# Patient Record
Sex: MALE | Race: WHITE | NOT HISPANIC OR LATINO | Employment: UNEMPLOYED | URBAN - METROPOLITAN AREA
[De-identification: names, ages, dates, MRNs, and addresses within clinical notes are randomized per-mention and may not be internally consistent; named-entity substitution may affect disease eponyms.]

---

## 2018-03-16 ENCOUNTER — APPOINTMENT (OUTPATIENT)
Dept: RADIOLOGY | Facility: CLINIC | Age: 23
End: 2018-03-16
Payer: COMMERCIAL

## 2018-03-16 ENCOUNTER — OFFICE VISIT (OUTPATIENT)
Dept: URGENT CARE | Facility: CLINIC | Age: 23
End: 2018-03-16
Payer: COMMERCIAL

## 2018-03-16 DIAGNOSIS — S53.409A ELBOW SPRAIN, INITIAL ENCOUNTER: Primary | ICD-10-CM

## 2018-03-16 DIAGNOSIS — S59.909A ELBOW INJURY, INITIAL ENCOUNTER: ICD-10-CM

## 2018-03-16 PROCEDURE — 73080 X-RAY EXAM OF ELBOW: CPT

## 2018-03-16 PROCEDURE — G0382 LEV 3 HOSP TYPE B ED VISIT: HCPCS | Performed by: PHYSICIAN ASSISTANT

## 2018-03-16 PROCEDURE — 99283 EMERGENCY DEPT VISIT LOW MDM: CPT | Performed by: PHYSICIAN ASSISTANT

## 2018-03-16 NOTE — PROGRESS NOTES
[facility]        NAME: Shalonda Richards is a 25 y o  male  : 1995    MRN: [id number]  DATE: 2018  TIME: 11:11 AM    Assessment and Plan   Elbow sprain, initial encounter [Q39 [id number]]  1  Elbow sprain, initial encounter  XR elbow 3+ vw left       Patient Instructions     Rest, apply ice, apply compression via ace, and discontinue splint  Follow up with PCP in 3-5 days  Proceed to  ER if symptoms worsen  Chief Complaint   No chief complaint on file  History of Present Illness       20yo M p/w pain in left elbow s/p fall onto outstretched hand 2 days ago  Patient states he hyperextended elbow during fall  Patient endorses diffuse, sharp 8/10 pain  Pt is unable to fully flex elbow 2/2 pain  Patient denies numbness, weakness, tingling, color change  Review of Systems   Review of Systems   Constitutional: Negative for activity change, appetite change, chills, diaphoresis, fatigue, fever and unexpected weight change  Respiratory: Negative for apnea, cough, choking, chest tightness, shortness of breath, wheezing and stridor  Cardiovascular: Negative for chest pain, palpitations and leg swelling  Musculoskeletal: Positive for arthralgias and myalgias  Negative for back pain, gait problem, joint swelling, neck pain and neck stiffness  Current Medications     No current outpatient prescriptions on file  Current Allergies     Allergies as of 2018    (Not on File)            The following portions of the patient's history were reviewed and updated as appropriate: allergies, current medications, past family history, past medical history, past social history, past surgical history and problem list      No past medical history on file  No past surgical history on file  No family history on file  Medications have been verified  Objective   There were no vitals taken for this visit         Physical Exam     Physical Exam   Constitutional: He appears well-developed and well-nourished  Cardiovascular: Normal rate, regular rhythm, normal heart sounds and intact distal pulses  Exam reveals no gallop and no friction rub  No murmur heard  Pulmonary/Chest: Effort normal and breath sounds normal  No respiratory distress  He has no wheezes  He has no rales  He exhibits no tenderness  Musculoskeletal:        Left elbow: He exhibits decreased range of motion  He exhibits no swelling, no effusion, no deformity and no laceration  Tenderness found  Radial head, medial epicondyle, lateral epicondyle and olecranon process tenderness noted

## 2018-04-09 ENCOUNTER — OFFICE VISIT (OUTPATIENT)
Dept: URGENT CARE | Facility: CLINIC | Age: 23
End: 2018-04-09
Payer: COMMERCIAL

## 2018-04-09 VITALS
RESPIRATION RATE: 16 BRPM | OXYGEN SATURATION: 98 % | SYSTOLIC BLOOD PRESSURE: 136 MMHG | BODY MASS INDEX: 25.73 KG/M2 | TEMPERATURE: 99.8 F | WEIGHT: 190 LBS | HEART RATE: 68 BPM | HEIGHT: 72 IN | DIASTOLIC BLOOD PRESSURE: 76 MMHG

## 2018-04-09 DIAGNOSIS — H60.501 ACUTE OTITIS EXTERNA OF RIGHT EAR, UNSPECIFIED TYPE: Primary | ICD-10-CM

## 2018-04-09 PROCEDURE — G0382 LEV 3 HOSP TYPE B ED VISIT: HCPCS | Performed by: PHYSICIAN ASSISTANT

## 2018-04-09 PROCEDURE — 99283 EMERGENCY DEPT VISIT LOW MDM: CPT | Performed by: PHYSICIAN ASSISTANT

## 2018-04-09 NOTE — PROGRESS NOTES
[email or web address]        NAME: Nahum Keating is a 25 y o  male  : 1995    MRN: [id number]  DATE: 2018  TIME: 3:02 PM    Assessment and Plan   Acute otitis externa of right ear, unspecified type [H60 501]  1  Acute otitis externa of right ear, unspecified type  neomycin-polymyxin-hydrocortisone (CORTISPORIN) otic solution     Patient Instructions     Take antibiotic drops as prescribed  Follow up with PCP in 3-5 days  Proceed to  ER if symptoms worsen  Chief Complaint     Chief Complaint   Patient presents with    Earache     bilateral ear pain left greater than right     History of Present Illness       Earache    There is pain in both ears  This is a new problem  The current episode started yesterday  The problem occurs constantly  The problem has been gradually worsening  There has been no fever  The pain is moderate  Associated symptoms include ear discharge  Pertinent negatives include no abdominal pain, coughing, diarrhea, headaches, hearing loss, neck pain, rash, rhinorrhea, sore throat or vomiting  He has tried nothing for the symptoms  There is no history of a chronic ear infection, hearing loss or a tympanostomy tube  Review of Systems   Review of Systems   Constitutional: Negative for activity change, appetite change, chills, diaphoresis, fatigue, fever and unexpected weight change  HENT: Positive for ear discharge and ear pain  Negative for hearing loss, rhinorrhea and sore throat  Respiratory: Negative for cough  Gastrointestinal: Negative for abdominal pain, diarrhea and vomiting  Musculoskeletal: Negative for neck pain  Skin: Negative for rash  Neurological: Negative for headaches           Current Medications       Current Outpatient Prescriptions:     neomycin-polymyxin-hydrocortisone (CORTISPORIN) otic solution, Administer 3 drops into both ears every 6 (six) hours for 7 days, Disp: 10 mL, Rfl: 0    Current Allergies     Allergies as of 2018    (No Known Allergies)            The following portions of the patient's history were reviewed and updated as appropriate: allergies, current medications, past family history, past medical history, past social history, past surgical history and problem list      History reviewed  No pertinent past medical history  Past Surgical History:   Procedure Laterality Date    CRANIOTOMY         No family history on file  Medications have been verified  Objective   /76   Pulse 68   Temp 99 8 °F (37 7 °C) (Tympanic)   Resp 16   Ht 6' (1 829 m)   Wt 86 2 kg (190 lb)   SpO2 98%   BMI 25 77 kg/m²        Physical Exam     Physical Exam   Constitutional: He appears well-developed and well-nourished  HENT:   Head: Normocephalic  Right Ear: Hearing, tympanic membrane and ear canal normal  There is drainage (purulent), swelling and tenderness  Tympanic membrane is not erythematous and not bulging  Left Ear: Hearing, tympanic membrane and ear canal normal  There is tenderness  No drainage or swelling  Nose: Nose normal    Mouth/Throat: Oropharynx is clear and moist  No oropharyngeal exudate  Visualization of left canal and left TM blocked by cerumen in canal   Cardiovascular: Normal rate, regular rhythm, normal heart sounds and intact distal pulses  Exam reveals no gallop and no friction rub  No murmur heard  Pulmonary/Chest: Effort normal and breath sounds normal  No respiratory distress  He has no wheezes  He has no rales  He exhibits no tenderness  Abdominal: Soft  Bowel sounds are normal  He exhibits no distension  There is no tenderness

## 2018-10-16 ENCOUNTER — EVALUATION (OUTPATIENT)
Dept: PHYSICAL THERAPY | Facility: CLINIC | Age: 23
End: 2018-10-16
Payer: COMMERCIAL

## 2018-10-16 DIAGNOSIS — G89.29 CHRONIC BILATERAL LOW BACK PAIN WITHOUT SCIATICA: Primary | ICD-10-CM

## 2018-10-16 DIAGNOSIS — M54.50 CHRONIC BILATERAL LOW BACK PAIN WITHOUT SCIATICA: Primary | ICD-10-CM

## 2018-10-16 PROCEDURE — G8990 OTHER PT/OT CURRENT STATUS: HCPCS | Performed by: PHYSICAL THERAPIST

## 2018-10-16 PROCEDURE — G8991 OTHER PT/OT GOAL STATUS: HCPCS | Performed by: PHYSICAL THERAPIST

## 2018-10-16 PROCEDURE — 97163 PT EVAL HIGH COMPLEX 45 MIN: CPT | Performed by: PHYSICAL THERAPIST

## 2018-10-16 PROCEDURE — 97110 THERAPEUTIC EXERCISES: CPT | Performed by: PHYSICAL THERAPIST

## 2018-10-16 PROCEDURE — 97014 ELECTRIC STIMULATION THERAPY: CPT | Performed by: PHYSICAL THERAPIST
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
## 2018-10-18 ENCOUNTER — TRANSCRIBE ORDERS (OUTPATIENT)
Dept: PHYSICAL THERAPY | Facility: CLINIC | Age: 23
End: 2018-10-18

## 2018-10-18 DIAGNOSIS — M54.5 LOW BACK PAIN, UNSPECIFIED BACK PAIN LATERALITY, UNSPECIFIED CHRONICITY, WITH SCIATICA PRESENCE UNSPECIFIED: Primary | ICD-10-CM

## 2018-10-22 ENCOUNTER — OFFICE VISIT (OUTPATIENT)
Dept: PHYSICAL THERAPY | Facility: CLINIC | Age: 23
End: 2018-10-22
Payer: COMMERCIAL

## 2018-10-22 DIAGNOSIS — G89.29 CHRONIC BILATERAL LOW BACK PAIN WITHOUT SCIATICA: Primary | ICD-10-CM

## 2018-10-22 DIAGNOSIS — M54.50 CHRONIC BILATERAL LOW BACK PAIN WITHOUT SCIATICA: Primary | ICD-10-CM

## 2018-10-22 PROCEDURE — 97014 ELECTRIC STIMULATION THERAPY: CPT | Performed by: PHYSICAL THERAPIST

## 2018-10-22 PROCEDURE — 97140 MANUAL THERAPY 1/> REGIONS: CPT | Performed by: PHYSICAL THERAPIST

## 2018-10-22 PROCEDURE — 97110 THERAPEUTIC EXERCISES: CPT | Performed by: PHYSICAL THERAPIST

## 2018-10-22 NOTE — PROGRESS NOTES
Daily Note     Today's date: 10/22/2018  Patient name: Raymond Jimenez  : 1995  MRN: [id number]  Referring provider: Tamiko Cisneros  Dx:   Encounter Diagnosis     ICD-10-CM    1  Chronic bilateral low back pain without sciatica M54 5     G89 29                   Subjective: Pt  States pain in somewhat improved with HEP, but he continues to have significant stiffness and pain  Objective: See treatment diary below      Assessment: Pt  Continues to have increased paraspinal activation with there-ex  Requires frequent cuing to correct this  Abdominal bracing and PPT allow for decreased pain  Pt  Educated on importance of HEP and PT compliance to address impairments  Pt  Would benefit from continued PT to maixmize function and increase activity tolerance  Plan: Continue per plan of care  Progress treatment as tolerated        Precautions: TBI 2015, Depression    Daily Treatment Diary     Manual  10/16 10/22           Pelvic Rocking - 10 min           P-A Mobz - -           Sacral Rocking - 5'                                         Exercise Diary  10/16 10/22           LTR 10x 10x           SKTC 10x 10x           Piriformis Stretch 4x30" 4x30"           Nerve Glides Supine, 10x5" Supine 10x5"           Cat/cow 10x 20x           PPT with abdominal brace - 10x10"                                                                                                                                                                                                     Modalities  10/16 10/22           IFC with MHP 15 min no MHP 15 min

## 2018-10-25 ENCOUNTER — OFFICE VISIT (OUTPATIENT)
Dept: PHYSICAL THERAPY | Facility: CLINIC | Age: 23
End: 2018-10-25
Payer: COMMERCIAL

## 2018-10-25 DIAGNOSIS — G89.29 CHRONIC BILATERAL LOW BACK PAIN WITHOUT SCIATICA: Primary | ICD-10-CM

## 2018-10-25 DIAGNOSIS — M54.50 CHRONIC BILATERAL LOW BACK PAIN WITHOUT SCIATICA: Primary | ICD-10-CM

## 2018-10-25 PROCEDURE — 97140 MANUAL THERAPY 1/> REGIONS: CPT

## 2018-10-25 PROCEDURE — 97110 THERAPEUTIC EXERCISES: CPT

## 2018-10-25 NOTE — PROGRESS NOTES
Daily Note     Today's date: 10/25/2018  Patient name: Deanna Brown  : 1995  MRN: [id number]  Referring provider: Serena Cullen  Dx:   Encounter Diagnosis     ICD-10-CM    1  Chronic bilateral low back pain without sciatica M54 5     G89 29                   Subjective: Patient reports pain today upon arrival 8/10  Patient reports directly in the low back  Reports post PT pain a 0/10  Objective: See treatment diary below      Assessment: Patient pain appears to decrease with motion  Limited carry over between visits coupled with inconsistent attendance may slow rehab process  Patient counseled on importance of consistent attendance  Plan: Continue per plan of care       Precautions: TBI 2015, Depression    Daily Treatment Diary     Manual  10/16 10/22 10/25          Pelvic Rocking - 10 min           P-A Mobz - -           Sacral Rocking - 5'                                         Exercise Diary  10/16 10/22 10/25          LTR 10x 10x 10x          SKTC 10x 10x x10          Piriformis Stretch 4x30" 4x30" 4x30"          Nerve Glides Supine, 10x5" Supine 10x5" supine 10x5"          Cat/cow 10x 20x 5"x20          PPT with abdominal brace - 10x10" 10"x15                                                                                                                                                                                                    Modalities  10/16 10/22           IFC with MHP 15 min no MHP 15 min

## 2018-10-29 ENCOUNTER — OFFICE VISIT (OUTPATIENT)
Dept: PHYSICAL THERAPY | Facility: CLINIC | Age: 23
End: 2018-10-29
Payer: COMMERCIAL

## 2018-10-29 DIAGNOSIS — G89.29 CHRONIC BILATERAL LOW BACK PAIN WITHOUT SCIATICA: Primary | ICD-10-CM

## 2018-10-29 DIAGNOSIS — M54.50 CHRONIC BILATERAL LOW BACK PAIN WITHOUT SCIATICA: Primary | ICD-10-CM

## 2018-10-29 PROCEDURE — 97014 ELECTRIC STIMULATION THERAPY: CPT

## 2018-10-29 PROCEDURE — 97112 NEUROMUSCULAR REEDUCATION: CPT

## 2018-10-29 PROCEDURE — 97140 MANUAL THERAPY 1/> REGIONS: CPT

## 2018-10-29 PROCEDURE — 97110 THERAPEUTIC EXERCISES: CPT

## 2018-10-29 NOTE — PROGRESS NOTES
Daily Note     Today's date: 10/29/2018  Patient name: Noris Hollins  : 1995  MRN: [id number]  Referring provider: Shelley Berkowitz  Dx:   Encounter Diagnosis     ICD-10-CM    1  Chronic bilateral low back pain without sciatica M54 5     G89 29        Start Time: 1500  Stop Time: 1600  Total time in clinic (min): 60 minutes    Subjective: Patient noted his relief after PT lasts for about 12 hours and then he becomes stiff  Patient noted he is stiff at the start of the session  Objective: See treatment diary below      Precautions: TBI , Depression    Daily Treatment Diary     Manual  10/16 10/22 10/25 10/29         Pelvic Rocking - 10 min           P-A Mobz - -  MW  5'         Sacral Rocking - 5'           Lumbar gapping technique    MW  5'                          Exercise Diary  10/16 10/22 10/25 10/29         LTR 10x 10x 10x 10x         SKTC 10x 10x x10 10x         Piriformis Stretch 4x30" 4x30" 4x30" 4 x 30  "         Nerve Glides Supine, 10x5" Supine 10x5" supine 10x5" Supine  x20 ea         Cat/cow 10x 20x 5"x20          PPT with abdominal brace - 10x10" 10"x15 10" x 15         PPT with marching    x20                                                                                                                                                                                      Modalities  10/16 10/22 10/29          IFC with MHP 15 min no MHP 15 min 15 min with HP                                            Assessment: PT introduce lumbar gapping technique grade IV-V to assist c stiffness and ROM  Patient noted improvements after mobilizations were performed  Patient performed TrA contraction with marching and patient noted slight increased in pain 2* form  Patient noted he felt better than he did at the start of the session  Patient would benefit from continued PT to allow the patient to return to his PLOF  Plan: Continue per plan of care

## 2018-11-27 NOTE — PROGRESS NOTES
DC Summary:    Kwame Elise has not been been treated in PT since 10/29  Patient did not return phone call to schedule additional appointments and will be discharged at this time      Edward Summers, PT  11/27/18

## 2019-04-15 ENCOUNTER — OFFICE VISIT (OUTPATIENT)
Dept: OBGYN CLINIC | Facility: CLINIC | Age: 24
End: 2019-04-15
Payer: COMMERCIAL

## 2019-04-15 ENCOUNTER — OFFICE VISIT (OUTPATIENT)
Dept: URGENT CARE | Facility: CLINIC | Age: 24
End: 2019-04-15
Payer: COMMERCIAL

## 2019-04-15 ENCOUNTER — APPOINTMENT (OUTPATIENT)
Dept: RADIOLOGY | Facility: CLINIC | Age: 24
End: 2019-04-15
Payer: COMMERCIAL

## 2019-04-15 VITALS
WEIGHT: 185 LBS | SYSTOLIC BLOOD PRESSURE: 117 MMHG | HEIGHT: 72 IN | DIASTOLIC BLOOD PRESSURE: 77 MMHG | BODY MASS INDEX: 25.06 KG/M2 | HEART RATE: 97 BPM

## 2019-04-15 VITALS
WEIGHT: 185 LBS | BODY MASS INDEX: 25.06 KG/M2 | HEIGHT: 72 IN | DIASTOLIC BLOOD PRESSURE: 62 MMHG | TEMPERATURE: 98.8 F | HEART RATE: 84 BPM | SYSTOLIC BLOOD PRESSURE: 125 MMHG | RESPIRATION RATE: 18 BRPM | OXYGEN SATURATION: 95 %

## 2019-04-15 DIAGNOSIS — S82.831A OTHER CLOSED FRACTURE OF DISTAL END OF RIGHT FIBULA, INITIAL ENCOUNTER: Primary | ICD-10-CM

## 2019-04-15 DIAGNOSIS — M25.561 ACUTE PAIN OF RIGHT KNEE: ICD-10-CM

## 2019-04-15 DIAGNOSIS — S82.831A CLOSED FRACTURE OF DISTAL END OF RIGHT FIBULA, UNSPECIFIED FRACTURE MORPHOLOGY, INITIAL ENCOUNTER: Primary | ICD-10-CM
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
## 2019-04-15 PROCEDURE — 73610 X-RAY EXAM OF ANKLE: CPT

## 2019-04-15 PROCEDURE — 73564 X-RAY EXAM KNEE 4 OR MORE: CPT

## 2019-04-15 PROCEDURE — 27786 TREATMENT OF ANKLE FRACTURE: CPT | Performed by: ORTHOPAEDIC SURGERY

## 2019-04-15 PROCEDURE — 99204 OFFICE O/P NEW MOD 45 MIN: CPT | Performed by: ORTHOPAEDIC SURGERY

## 2019-04-15 PROCEDURE — G0382 LEV 3 HOSP TYPE B ED VISIT: HCPCS | Performed by: PHYSICIAN ASSISTANT

## 2019-04-15 PROCEDURE — 99283 EMERGENCY DEPT VISIT LOW MDM: CPT | Performed by: PHYSICIAN ASSISTANT

## 2019-04-15 PROCEDURE — 99203 OFFICE O/P NEW LOW 30 MIN: CPT | Performed by: PHYSICIAN ASSISTANT

## 2019-04-15 PROCEDURE — 29515 APPLICATION SHORT LEG SPLINT: CPT | Performed by: PHYSICIAN ASSISTANT

## 2019-04-15 RX ORDER — TRAMADOL HYDROCHLORIDE 50 MG/1
50 TABLET ORAL EVERY 6 HOURS PRN
Qty: 30 TABLET | Refills: 0 | Status: SHIPPED | OUTPATIENT
Start: 2019-04-15 | End: 2019-05-31 | Stop reason: ALTCHOICE

## 2019-04-22 ENCOUNTER — APPOINTMENT (OUTPATIENT)
Dept: RADIOLOGY | Facility: CLINIC | Age: 24
End: 2019-04-22
Payer: COMMERCIAL

## 2019-04-22 ENCOUNTER — OFFICE VISIT (OUTPATIENT)
Dept: OBGYN CLINIC | Facility: CLINIC | Age: 24
End: 2019-04-22

## 2019-04-22 VITALS
BODY MASS INDEX: 25.33 KG/M2 | HEIGHT: 72 IN | SYSTOLIC BLOOD PRESSURE: 112 MMHG | WEIGHT: 187 LBS | DIASTOLIC BLOOD PRESSURE: 74 MMHG | HEART RATE: 69 BPM

## 2019-04-22 DIAGNOSIS — S82.831D OTHER CLOSED FRACTURE OF DISTAL END OF RIGHT FIBULA WITH ROUTINE HEALING, SUBSEQUENT ENCOUNTER: ICD-10-CM

## 2019-04-22 DIAGNOSIS — S82.831D OTHER CLOSED FRACTURE OF DISTAL END OF RIGHT FIBULA WITH ROUTINE HEALING, SUBSEQUENT ENCOUNTER: Primary | ICD-10-CM

## 2019-04-22 PROCEDURE — 99024 POSTOP FOLLOW-UP VISIT: CPT | Performed by: ORTHOPAEDIC SURGERY

## 2019-04-22 PROCEDURE — 73610 X-RAY EXAM OF ANKLE: CPT

## 2019-05-06 ENCOUNTER — OFFICE VISIT (OUTPATIENT)
Dept: OBGYN CLINIC | Facility: CLINIC | Age: 24
End: 2019-05-06

## 2019-05-06 ENCOUNTER — APPOINTMENT (OUTPATIENT)
Dept: RADIOLOGY | Facility: CLINIC | Age: 24
End: 2019-05-06
Payer: COMMERCIAL

## 2019-05-06 VITALS
HEART RATE: 87 BPM | DIASTOLIC BLOOD PRESSURE: 85 MMHG | BODY MASS INDEX: 24.11 KG/M2 | SYSTOLIC BLOOD PRESSURE: 121 MMHG | WEIGHT: 178 LBS | HEIGHT: 72 IN

## 2019-05-06 DIAGNOSIS — S82.831D OTHER CLOSED FRACTURE OF DISTAL END OF RIGHT FIBULA WITH ROUTINE HEALING, SUBSEQUENT ENCOUNTER: Primary | ICD-10-CM

## 2019-05-06 DIAGNOSIS — S82.831D OTHER CLOSED FRACTURE OF DISTAL END OF RIGHT FIBULA WITH ROUTINE HEALING, SUBSEQUENT ENCOUNTER: ICD-10-CM

## 2019-05-06 PROCEDURE — 73610 X-RAY EXAM OF ANKLE: CPT

## 2019-05-06 PROCEDURE — 99024 POSTOP FOLLOW-UP VISIT: CPT | Performed by: ORTHOPAEDIC SURGERY

## 2019-05-13 ENCOUNTER — TELEPHONE (OUTPATIENT)
Dept: OBGYN CLINIC | Facility: CLINIC | Age: 24
End: 2019-05-13

## 2019-05-31 ENCOUNTER — OFFICE VISIT (OUTPATIENT)
Dept: OBGYN CLINIC | Facility: CLINIC | Age: 24
End: 2019-05-31

## 2019-05-31 ENCOUNTER — APPOINTMENT (OUTPATIENT)
Dept: RADIOLOGY | Facility: CLINIC | Age: 24
End: 2019-05-31
Payer: COMMERCIAL

## 2019-05-31 VITALS
HEART RATE: 71 BPM | SYSTOLIC BLOOD PRESSURE: 111 MMHG | HEIGHT: 72 IN | DIASTOLIC BLOOD PRESSURE: 75 MMHG | BODY MASS INDEX: 25.06 KG/M2 | WEIGHT: 185 LBS

## 2019-05-31 DIAGNOSIS — S82.831D OTHER CLOSED FRACTURE OF DISTAL END OF RIGHT FIBULA WITH ROUTINE HEALING, SUBSEQUENT ENCOUNTER: ICD-10-CM

## 2019-05-31 DIAGNOSIS — S82.831D OTHER CLOSED FRACTURE OF DISTAL END OF RIGHT FIBULA WITH ROUTINE HEALING, SUBSEQUENT ENCOUNTER: Primary | ICD-10-CM

## 2019-05-31 PROCEDURE — 73610 X-RAY EXAM OF ANKLE: CPT

## 2019-05-31 PROCEDURE — 99024 POSTOP FOLLOW-UP VISIT: CPT | Performed by: ORTHOPAEDIC SURGERY

## 2019-06-21 ENCOUNTER — APPOINTMENT (OUTPATIENT)
Dept: RADIOLOGY | Facility: CLINIC | Age: 24
End: 2019-06-21
Payer: COMMERCIAL

## 2019-06-21 ENCOUNTER — OFFICE VISIT (OUTPATIENT)
Dept: OBGYN CLINIC | Facility: CLINIC | Age: 24
End: 2019-06-21

## 2019-06-21 VITALS
BODY MASS INDEX: 25.06 KG/M2 | SYSTOLIC BLOOD PRESSURE: 104 MMHG | WEIGHT: 185 LBS | DIASTOLIC BLOOD PRESSURE: 71 MMHG | HEART RATE: 59 BPM | HEIGHT: 72 IN

## 2019-06-21 DIAGNOSIS — S82.831D OTHER CLOSED FRACTURE OF DISTAL END OF RIGHT FIBULA WITH ROUTINE HEALING, SUBSEQUENT ENCOUNTER: ICD-10-CM

## 2019-06-21 DIAGNOSIS — S82.831D OTHER CLOSED FRACTURE OF DISTAL END OF RIGHT FIBULA WITH ROUTINE HEALING, SUBSEQUENT ENCOUNTER: Primary | ICD-10-CM

## 2019-06-21 PROCEDURE — 73610 X-RAY EXAM OF ANKLE: CPT

## 2019-06-21 PROCEDURE — 99024 POSTOP FOLLOW-UP VISIT: CPT | Performed by: ORTHOPAEDIC SURGERY

## 2019-07-15 ENCOUNTER — APPOINTMENT (OUTPATIENT)
Dept: RADIOLOGY | Facility: CLINIC | Age: 24
End: 2019-07-15
Payer: COMMERCIAL

## 2019-07-15 ENCOUNTER — OFFICE VISIT (OUTPATIENT)
Dept: OBGYN CLINIC | Facility: CLINIC | Age: 24
End: 2019-07-15

## 2019-07-15 VITALS
WEIGHT: 184 LBS | HEART RATE: 77 BPM | BODY MASS INDEX: 24.92 KG/M2 | DIASTOLIC BLOOD PRESSURE: 74 MMHG | HEIGHT: 72 IN | SYSTOLIC BLOOD PRESSURE: 122 MMHG

## 2019-07-15 DIAGNOSIS — S82.831D OTHER CLOSED FRACTURE OF DISTAL END OF RIGHT FIBULA WITH ROUTINE HEALING, SUBSEQUENT ENCOUNTER: Primary | ICD-10-CM

## 2019-07-15 DIAGNOSIS — S82.831D OTHER CLOSED FRACTURE OF DISTAL END OF RIGHT FIBULA WITH ROUTINE HEALING, SUBSEQUENT ENCOUNTER: ICD-10-CM

## 2019-07-15 PROCEDURE — 99024 POSTOP FOLLOW-UP VISIT: CPT | Performed by: ORTHOPAEDIC SURGERY

## 2019-07-15 PROCEDURE — 73610 X-RAY EXAM OF ANKLE: CPT

## 2019-07-15 NOTE — LETTER
July 15, 2019     Patient: Maria L Ugalde   YOB: 1995   Date of Visit: 7/15/2019       To Whom it May Concern:    Maria L Ugalde is under my professional care  He was seen in my office on 7/15/2019  He may return to work on 07/15/2019  He is permitted to work at full duty but must wear a brace right ankle while working       If you have any questions or concerns, please don't hesitate to call           Sincerely,          Rose Best        CC: Maria L Ugalde Yes

## 2019-07-15 NOTE — PATIENT INSTRUCTIONS
Use ankle brace, either lace-up or air-stirrup whenever ambulating  Increase use of the lace-up brace in the house, transitioning to use of the air stirrup only when out of the house over the next couple of weeks  Contact the office if questions or concerns arise

## 2019-08-01 DIAGNOSIS — S82.831D OTHER CLOSED FRACTURE OF DISTAL END OF RIGHT FIBULA WITH ROUTINE HEALING, SUBSEQUENT ENCOUNTER: Primary | ICD-10-CM

## 2020-12-03 ENCOUNTER — HOSPITAL ENCOUNTER (EMERGENCY)
Facility: HOSPITAL | Age: 25
Discharge: HOME | End: 2020-12-03
Attending: EMERGENCY MEDICINE
Payer: COMMERCIAL

## 2020-12-03 ENCOUNTER — APPOINTMENT (EMERGENCY)
Dept: RADIOLOGY | Facility: HOSPITAL | Age: 25
End: 2020-12-03
Payer: COMMERCIAL

## 2020-12-03 VITALS
OXYGEN SATURATION: 97 % | BODY MASS INDEX: 25.73 KG/M2 | WEIGHT: 190 LBS | RESPIRATION RATE: 18 BRPM | SYSTOLIC BLOOD PRESSURE: 143 MMHG | DIASTOLIC BLOOD PRESSURE: 100 MMHG | TEMPERATURE: 97.8 F | HEIGHT: 72 IN | HEART RATE: 69 BPM

## 2020-12-03 DIAGNOSIS — S29.011A CHEST WALL MUSCLE STRAIN, INITIAL ENCOUNTER: Primary | ICD-10-CM

## 2020-12-03 PROCEDURE — 71046 X-RAY EXAM CHEST 2 VIEWS: CPT

## 2020-12-03 PROCEDURE — 99283 EMERGENCY DEPT VISIT LOW MDM: CPT

## 2020-12-03 PROCEDURE — 99284 EMERGENCY DEPT VISIT MOD MDM: CPT | Performed by: EMERGENCY MEDICINE

## 2020-12-03 RX ORDER — NAPROXEN 500 MG/1
500 TABLET ORAL 2 TIMES DAILY PRN
Qty: 30 TABLET | Refills: 1 | Status: SHIPPED | OUTPATIENT
Start: 2020-12-03

## 2020-12-03 RX ORDER — NAPROXEN 250 MG/1
500 TABLET ORAL ONCE
Status: COMPLETED | OUTPATIENT
Start: 2020-12-03 | End: 2020-12-03

## 2020-12-03 RX ADMIN — NAPROXEN 500 MG: 250 TABLET ORAL at 14:47

## 2021-06-11 ENCOUNTER — OFFICE VISIT (OUTPATIENT)
Dept: URGENT CARE | Facility: CLINIC | Age: 26
End: 2021-06-11
Payer: COMMERCIAL

## 2021-06-11 ENCOUNTER — APPOINTMENT (OUTPATIENT)
Dept: RADIOLOGY | Facility: CLINIC | Age: 26
End: 2021-06-11
Payer: COMMERCIAL

## 2021-06-11 VITALS
HEIGHT: 72 IN | TEMPERATURE: 98.4 F | RESPIRATION RATE: 16 BRPM | WEIGHT: 200 LBS | DIASTOLIC BLOOD PRESSURE: 94 MMHG | BODY MASS INDEX: 27.09 KG/M2 | OXYGEN SATURATION: 96 % | HEART RATE: 106 BPM | SYSTOLIC BLOOD PRESSURE: 145 MMHG

## 2021-06-11 DIAGNOSIS — S99.912A INJURY OF LEFT ANKLE, INITIAL ENCOUNTER: Primary | ICD-10-CM

## 2021-06-11 DIAGNOSIS — S99.912A INJURY OF LEFT ANKLE, INITIAL ENCOUNTER: ICD-10-CM

## 2021-06-11 PROCEDURE — 99213 OFFICE O/P EST LOW 20 MIN: CPT | Performed by: EMERGENCY MEDICINE

## 2021-06-11 PROCEDURE — 73610 X-RAY EXAM OF ANKLE: CPT

## 2021-06-11 NOTE — PATIENT INSTRUCTIONS
Ankle Sprain   WHAT YOU NEED TO KNOW:   An ankle sprain happens when 1 or more ligaments in your ankle joint stretch or tear  Ligaments are tough tissues that connect bones  Ligaments support your joints and keep your bones in place  DISCHARGE INSTRUCTIONS:   Return to the emergency department if:   · You have severe pain in your ankle  · Your foot or toes are cold or numb  · Your ankle becomes more weak or unstable (wobbly)  · You are unable to put any weight on your ankle or foot  · Your swelling has increased or returned  Call your doctor if:   · Your pain does not go away, even after treatment  · You have questions or concerns about your condition or care  Medicines: You may need any of the following:  · NSAIDs , such as ibuprofen, help decrease swelling, pain, and fever  This medicine is available with or without a doctor's order  NSAIDs can cause stomach bleeding or kidney problems in certain people  If you take blood thinner medicine, always ask your healthcare provider if NSAIDs are safe for you  Always read the medicine label and follow directions  · Acetaminophen  decreases pain and fever  It is available without a doctor's order  Ask how much to take and how often to take it  Follow directions  Read the labels of all other medicines you are using to see if they also contain acetaminophen, or ask your doctor or pharmacist  Acetaminophen can cause liver damage if not taken correctly  Do not use more than 4 grams (4,000 milligrams) total of acetaminophen in one day  · Prescription pain medicine  may be given  Ask your healthcare provider how to take this medicine safely  Some prescription pain medicines contain acetaminophen  Do not take other medicines that contain acetaminophen without talking to your healthcare provider  Too much acetaminophen may cause liver damage  Prescription pain medicine may cause constipation   Ask your healthcare provider how to prevent or treat constipation  · Take your medicine as directed  Contact your healthcare provider if you think your medicine is not helping or if you have side effects  Tell him or her if you are allergic to any medicine  Keep a list of the medicines, vitamins, and herbs you take  Include the amounts, and when and why you take them  Bring the list or the pill bottles to follow-up visits  Carry your medicine list with you in case of an emergency  Self-care:   · Use support devices,  such as a brace, cast, or splint, to limit your movement and protect your joint  You may need to use crutches to decrease your pain as you move around  · Go to physical therapy as directed  A physical therapist teaches you exercises to help improve movement and strength, and to decrease pain  · Rest  your ankle so that it can heal  Return to normal activities as directed  · Apply ice  on your ankle for 15 to 20 minutes every hour or as directed  Use an ice pack, or put crushed ice in a plastic bag  Cover it with a towel  Ice helps prevent tissue damage and decreases swelling and pain  · Compress  your ankle  Ask if you should wrap an elastic bandage around your injured ligament  An elastic bandage provides support and helps decrease swelling and movement so your joint can heal  Wear as long as directed  · Elevate  your ankle above the level of your heart as often as you can  This will help decrease swelling and pain  Prop your ankle on pillows or blankets to keep it elevated comfortably  Prevent another ankle sprain:   · Let your ankle heal   Find out how long your ligament needs to heal  Do not do any physical activity until your healthcare provider says it is okay  If you start activity too soon, you may develop a more serious injury  · Always warm up and stretch  before you exercise or play sports  · Use the right equipment  Always wear shoes that fit well and are made for the activity that you are doing   You may also need ankle supports, elbow and knee pads, or braces  Follow up with your doctor as directed:  Write down your questions so you remember to ask them during your visits  © Copyright [address] Information is for End User's use only and may not be sold, redistributed or otherwise used for commercial purposes  All illustrations and images included in CareNotes® are the copyrighted property of [employer]  or Melani Peres  The above information is an  only  It is not intended as medical advice for individual conditions or treatments  Talk to your doctor, nurse or pharmacist before following any medical regimen to see if it is safe and effective for you  Crutch Instructions   WHAT YOU NEED TO KNOW:   Crutches are tools that provide support and balance when you walk  You may need 1 or 2 crutches to help support your body weight  You may need crutches if you had surgery or an injury that affects your ability to walk  DISCHARGE INSTRUCTIONS:   How to use crutches safely:   · Support your weight with your arms and hands  Do not support your weight with your armpits  This could hurt the nerves that are in your underarms  Keep your elbow bent when the crutch is in place under your arm  · Walk slowly and carefully with crutches  Go up and down stairs and ramps slowly, and stop to rest when you feel tired  Get up slowly to a sitting or standing position  This will help prevent dizziness and fainting  Use your crutches only on firm ground  Use caution when you walk on ice or snow  Wet or waxed floors and smooth cement floors can be slippery  Watch out for small rugs or cords  How to walk with crutches:   · Place both crutches under your arms, and place your hands on the hand  of the crutches  Place your crutches slightly in front of you  · The top of the crutches should be about 2 fingers [id number] (about 1½ inches) below your armpits  Place your weight on your hands  The top of the crutches should not press into your armpits  · If you have one leg that is injured, keep it off the floor by bending your knee  · Lift the crutches and move them a step ahead of you  Put the rubber ends of the crutches firmly on the ground  Move the foot that is not injured between the crutches  Place that heel down first     · If you are using your crutches for balance, move your right foot and left crutch forward  Then move your left foot and right crutch forward  Keep walking this way  How to go up stairs with crutches:   · Face the stairs  Put the crutches close to the first step  · Push onto the crutches and put your uninjured leg on the first step  · Put your weight on your uninjured leg that is on the first step  Bring both crutches and the injured leg onto the step at the same time  · When you hold onto a railing with one arm, put both crutches under the other arm  Use the railing to help you go up stairs  How to go down stairs with crutches:   · Stand with the toes of your uninjured leg close to the edge of the step  · Bend the knee of your uninjured leg  Slowly lower both crutches along with the injured leg onto the next step  · Lean on your crutches  Slowly lower your uninjured leg onto the same step  · Place both crutches under one arm while you hold onto the railing with the other arm  How to sit in a chair with crutches:   · Turn and back up to the chair until you feel the edge of it against the back of your legs  Keep your injured leg forward  · Take your crutches out from under your arms  Sit while bending your uninjured knee  How to get up from a chair with crutches:   · Sit on the edge of your chair  · Push up with your hands using the crutches or arms of the chair  Put your weight on your uninjured foot as you get up  · Keep your injured leg bent at the knee and off the floor         Contact your healthcare provider if: · Your crutches do not fit  · One crutch is longer than the other  · Your crutches break or get lost     · The rubber tips of your crutches are split or loose  · You get blisters or painful calluses on your hands or armpits  · Your armpit is red, sore, or has bumps or pimples  · Your arm muscles get weaker the longer you use the crutches  · You have questions or concerns about your condition or care  Return to the emergency department if:   · You have sudden numbness in a hand or arm  · Your fingers feel cold or have cramping pain  © Copyright [address] Information is for End User's use only and may not be sold, redistributed or otherwise used for commercial purposes  All illustrations and images included in CareNotes® are the copyrighted property of [employer]  or Melani Peres  The above information is an  only  It is not intended as medical advice for individual conditions or treatments  Talk to your doctor, nurse or pharmacist before following any medical regimen to see if it is safe and effective for you

## 2021-06-11 NOTE — PROGRESS NOTES
[facility]        NAME: Radha Ferrer is a 26 y o  male  : 1995    MRN: [id number]  DATE: 2021  TIME: 11:09 AM    Assessment and Plan   Injury of left ankle, initial encounter [S99 912A]  1  Injury of left ankle, initial encounter  XR ankle 3+ vw left         Patient Instructions     There are no Patient Instructions on file for this visit  Follow up with PCP in 3-5 days  Proceed to  ER if symptoms worsen  Chief Complaint     Chief Complaint   Patient presents with    Ankle Injury     pt states he took a spilll on his skateboard last night and hurt his left ankle  swollen, iced it last night          History of Present Illness       Patient complains of pain lateral left ankle since inversion injury to same yesterday while riding his skateboard  He has history of prior fracture of his right ankle for which he still has crutches but no longer has the brace  Review of Systems   Review of Systems   Constitutional: Negative for chills and fever  Musculoskeletal: Positive for arthralgias and joint swelling  Negative for gait problem and myalgias  Skin: Negative for color change, rash and wound  Neurological: Negative for numbness and headaches           Current Medications       Current Outpatient Medications:     naproxen (NAPROSYN) 500 mg tablet, Take 1 tablet (500 mg total) by mouth 2 (two) times a day as needed for moderate pain (Patient not taking: Reported on 2021), Disp: 30 tablet, Rfl: 1    Current Allergies     Allergies as of 2021    (No Known Allergies)            The following portions of the patient's history were reviewed and updated as appropriate: allergies, current medications, past family history, past medical history, past social history, past surgical history and problem list      Past Medical History:   Diagnosis Date    Fractures     Oth fracture of fifth lumbar vertebra, init for clos fx ([facility] [address] ) 2015       Past Surgical History: Procedure Laterality Date    CRANIOTOMY         Family History   Problem Relation Age of Onset    No Known Problems Mother     No Known Problems Father     No Known Problems Brother          Medications have been verified  Objective   /94   Pulse (!) 106   Temp 98 4 °F (36 9 °C)   Resp 16   Ht 6' (1 829 m)   Wt 90 7 kg (200 lb)   SpO2 96%   BMI 27 12 kg/m²        Physical Exam     Physical Exam  Vitals signs and nursing note reviewed  Constitutional:       General: He is not in acute distress  Appearance: He is well-developed  Neck:      Musculoskeletal: Neck supple  Cardiovascular:      Rate and Rhythm: Normal rate and regular rhythm  Pulmonary:      Effort: Pulmonary effort is normal       Breath sounds: Normal breath sounds  Musculoskeletal:         General: Tenderness present  No deformity  Comments: Tender swollen left ankle laterally   Skin:     General: Skin is warm and dry  Findings: No erythema or rash  Neurological:      Mental Status: He is alert and oriented to person, place, and time  Deep Tendon Reflexes: Reflexes are normal and symmetric  Psychiatric:         Mood and Affect: Mood normal          Behavior: Behavior normal          Thought Content:  Thought content normal          Judgment: Judgment normal

## 2024-02-28 ENCOUNTER — OFFICE VISIT (OUTPATIENT)
Dept: URGENT CARE | Facility: CLINIC | Age: 29
End: 2024-02-28
Payer: COMMERCIAL

## 2024-02-28 ENCOUNTER — APPOINTMENT (OUTPATIENT)
Dept: RADIOLOGY | Facility: CLINIC | Age: 29
End: 2024-02-28
Payer: COMMERCIAL

## 2024-02-28 VITALS
OXYGEN SATURATION: 97 % | RESPIRATION RATE: 18 BRPM | HEIGHT: 72 IN | TEMPERATURE: 97.3 F | HEART RATE: 67 BPM | WEIGHT: 205.8 LBS | SYSTOLIC BLOOD PRESSURE: 134 MMHG | DIASTOLIC BLOOD PRESSURE: 77 MMHG | BODY MASS INDEX: 27.87 KG/M2

## 2024-02-28 DIAGNOSIS — R07.81 RIB PAIN ON RIGHT SIDE: Primary | ICD-10-CM

## 2024-02-28 DIAGNOSIS — R07.81 RIB PAIN ON RIGHT SIDE: ICD-10-CM

## 2024-02-28 PROCEDURE — 71101 X-RAY EXAM UNILAT RIBS/CHEST: CPT

## 2024-02-28 PROCEDURE — 99213 OFFICE O/P EST LOW 20 MIN: CPT | Performed by: NURSE PRACTITIONER

## 2024-02-28 RX ORDER — METHOCARBAMOL 500 MG/1
500 TABLET, FILM COATED ORAL 3 TIMES DAILY PRN
COMMUNITY
Start: 2023-12-29

## 2024-02-28 RX ORDER — NAPROXEN 500 MG/1
500 TABLET ORAL 2 TIMES DAILY WITH MEALS
Qty: 20 TABLET | Refills: 0 | Status: SHIPPED | OUTPATIENT
Start: 2024-02-28 | End: 2024-03-09

## 2024-02-28 RX ORDER — GABAPENTIN 300 MG/1
300 CAPSULE ORAL DAILY
COMMUNITY
Start: 2023-12-29

## 2024-02-28 NOTE — PROGRESS NOTES
[facility]        NAME: Manuel Kendrick is a 28 y.o. male  : 1995    MRN: [id number]  DATE: 2024  TIME: 2:52 PM    Assessment and Plan   Rib pain on right side [R07.81]  1. Rib pain on right side  XR ribs right w pa chest min 3 views    naproxen (Naprosyn) 500 mg tablet        Acute symptomatic wet read x-ray negative will Heed radiology read.  Will recommend start naproxen 500 mg twice daily x 10 days educated on side effects proper use of medication follow-up with primary care with worsening symptoms no improvement.  Discussed with patient using his incentive spirometer that he has at home-patient verbalized understanding.  Will only call if x-ray results are positive    Patient Instructions       Follow up with PCP in 3-5 days.  Proceed to  ER if symptoms worsen.    Chief Complaint     Chief Complaint   Patient presents with   • Rib Injury     2024 Fell while snowboarding, sharp pain at right ribs under the breast, difficulty raising the right arm. Not taking anything for pain. Needs work note.         History of Present Illness       Patient reports with c/o right sided rib pain worse with right arm movement and palpation. Mild swelling and bruising. Patient reports on 2024 fell while snowboarding and fell onto his right side arm went into side with all weight. Worse with deep breathing.         Review of Systems   Review of Systems   Constitutional:  Negative for activity change, appetite change, chills, fatigue and fever.   HENT:  Negative for congestion, rhinorrhea, sinus pressure, sinus pain and sore throat.    Respiratory:  Negative for cough, chest tightness and shortness of breath.    Gastrointestinal:  Negative for constipation, diarrhea, nausea and vomiting.   Musculoskeletal:  Positive for arthralgias, joint swelling and myalgias.   Skin:  Negative for color change, pallor and rash.   Neurological:  Negative for dizziness, syncope, weakness, light-headedness and  headaches.   Hematological:  Negative for adenopathy.   Psychiatric/Behavioral:  Negative for agitation and confusion.          Current Medications       Current Outpatient Medications:   •  gabapentin (NEURONTIN) 300 mg capsule, Take 300 mg by mouth daily, Disp: , Rfl:   •  naproxen (Naprosyn) 500 mg tablet, Take 1 tablet (500 mg total) by mouth 2 (two) times a day with meals for 10 days, Disp: 20 tablet, Rfl: 0  •  methocarbamol (ROBAXIN) 500 mg tablet, Take 500 mg by mouth Three times daily as needed (Patient not taking: Reported on 2/28/2024), Disp: , Rfl:   •  naproxen (NAPROSYN) 500 mg tablet, Take 1 tablet (500 mg total) by mouth 2 (two) times a day as needed for moderate pain (Patient not taking: Reported on 6/11/2021), Disp: 30 tablet, Rfl: 1    Current Allergies     Allergies as of 02/28/2024   • (No Known Allergies)            The following portions of the patient's history were reviewed and updated as appropriate: allergies, current medications, past family history, past medical history, past social history, past surgical history and problem list.     Past Medical History:   Diagnosis Date   • Fractures    • Oth fracture of fifth lumbar vertebra, init for clos fx ([facility]) 07/25/2015       Past Surgical History:   Procedure Laterality Date   • CRANIOTOMY         Family History   Problem Relation Age of Onset   • No Known Problems Mother    • No Known Problems Father    • No Known Problems Brother          Medications have been verified.        Objective   /77   Pulse 67   Temp (!) 97.3 °F (36.3 °C)   Resp 18   Ht 6' (1.829 m)   Wt 93.4 kg (205 lb 12.8 oz)   SpO2 97%   BMI 27.91 kg/m²   No LMP for male patient.       Physical Exam     Physical Exam  Vitals and nursing note reviewed.   Constitutional:       General: He is not in acute distress.     Appearance: Normal appearance. He is not ill-appearing, toxic-appearing or diaphoretic.   HENT:      Head: Normocephalic and atraumatic.      Nose: No  congestion or rhinorrhea.      Mouth/Throat:      Mouth: Mucous membranes are moist.   Eyes:      General: No scleral icterus.        Right eye: No discharge.         Left eye: No discharge.      Conjunctiva/sclera: Conjunctivae normal.   Cardiovascular:      Rate and Rhythm: Normal rate and regular rhythm.   Pulmonary:      Effort: Pulmonary effort is normal. No respiratory distress.      Breath sounds: Normal breath sounds. No stridor. No wheezing, rhonchi or rales.   Chest:      Chest wall: Swelling and tenderness present. No mass or lacerations.          Comments: TTP to palpation  Musculoskeletal:         General: Swelling, tenderness and signs of injury present. Normal range of motion.      Cervical back: Normal range of motion.   Skin:     General: Skin is dry.   Neurological:      Mental Status: He is alert and oriented to person, place, and time.   Psychiatric:         Mood and Affect: Mood normal.         Behavior: Behavior normal.         Thought Content: Thought content normal.         Judgment: Judgment normal.

## 2024-02-28 NOTE — LETTER
February 28, 2024     Patient: Manuel Kendrick   YOB: 1995   Date of Visit: 2/28/2024       To Whom it May Concern:    Manuel Kendrick was seen in my clinic on 2/28/2024. He may return to work on 3/4/2024 .    If you have any questions or concerns, please don't hesitate to call.         Sincerely,          ELIZABETH Milton

## 2024-03-07 ENCOUNTER — OFFICE VISIT (OUTPATIENT)
Dept: FAMILY MEDICINE CLINIC | Facility: CLINIC | Age: 29
End: 2024-03-07
Payer: COMMERCIAL

## 2024-03-07 VITALS
HEART RATE: 72 BPM | HEIGHT: 72 IN | OXYGEN SATURATION: 99 % | DIASTOLIC BLOOD PRESSURE: 64 MMHG | WEIGHT: 206 LBS | SYSTOLIC BLOOD PRESSURE: 120 MMHG | BODY MASS INDEX: 27.9 KG/M2

## 2024-03-07 DIAGNOSIS — M51.26 LUMBAR DISC HERNIATION: ICD-10-CM

## 2024-03-07 DIAGNOSIS — Z00.00 ANNUAL PHYSICAL EXAM: Primary | ICD-10-CM

## 2024-03-07 PROCEDURE — 99214 OFFICE O/P EST MOD 30 MIN: CPT | Performed by: FAMILY MEDICINE

## 2024-03-07 PROCEDURE — 99385 PREV VISIT NEW AGE 18-39: CPT | Performed by: FAMILY MEDICINE

## 2024-03-07 RX ORDER — NALOXONE HYDROCHLORIDE 4 MG/.1ML
SPRAY NASAL
Qty: 1 EACH | Refills: 1 | Status: SHIPPED | OUTPATIENT
Start: 2024-03-07 | End: 2025-03-07

## 2024-03-07 RX ORDER — OXYCODONE HYDROCHLORIDE AND ACETAMINOPHEN 5; 325 MG/1; MG/1
1 TABLET ORAL EVERY 6 HOURS PRN
Qty: 30 TABLET | Refills: 0 | Status: SHIPPED | OUTPATIENT
Start: 2024-03-07
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
## 2024-03-08 ENCOUNTER — TELEPHONE (OUTPATIENT)
Dept: PHYSICAL THERAPY | Facility: OTHER | Age: 29
End: 2024-03-08

## 2024-03-08 NOTE — TELEPHONE ENCOUNTER
Attempt to reach the patient per Comprehensive Spine program referral.    BUSY FAST SIGNAL. Unable to leave a message.    This is the 1st attempt to recah the patient. Will defer referral per protocol.    NOTE: Hx of herniated discs.  [facility]  Declined Sx.  PT [facility]) in 2018 and [facility] in 2017.

## 2024-03-13 NOTE — TELEPHONE ENCOUNTER
Call placed to the patient per Comprehensive Spine Program referral.    Spoke with the patient, who stated he was driving and will call us back at a better time for triage    Comp spine closed  Will assist patient when he calls back

## 2024-03-21 ENCOUNTER — TELEPHONE (OUTPATIENT)
Dept: OTHER | Facility: HOSPITAL | Age: 29
End: 2024-03-21

## 2024-03-21 ENCOUNTER — OFFICE VISIT (OUTPATIENT)
Dept: FAMILY MEDICINE CLINIC | Facility: CLINIC | Age: 29
End: 2024-03-21
Payer: COMMERCIAL

## 2024-03-21 VITALS
DIASTOLIC BLOOD PRESSURE: 67 MMHG | OXYGEN SATURATION: 98 % | SYSTOLIC BLOOD PRESSURE: 136 MMHG | WEIGHT: 205.4 LBS | HEIGHT: 72 IN | HEART RATE: 77 BPM | BODY MASS INDEX: 27.82 KG/M2

## 2024-03-21 DIAGNOSIS — M51.26 LUMBAR DISC HERNIATION: ICD-10-CM

## 2024-03-21 PROCEDURE — 99214 OFFICE O/P EST MOD 30 MIN: CPT | Performed by: FAMILY MEDICINE

## 2024-03-21 RX ORDER — OXYCODONE HYDROCHLORIDE AND ACETAMINOPHEN 5; 325 MG/1; MG/1
1 TABLET ORAL EVERY 6 HOURS PRN
Qty: 100 TABLET | Refills: 0 | Status: SHIPPED | OUTPATIENT
Start: 2024-03-21

## 2024-03-21 NOTE — PROGRESS NOTES
Assessment/Plan:       1. Lumbar disc herniation  Comments:  ref to spine program  increase percocet quantity  Orders:  -     oxyCODONE-acetaminophen (Percocet) 5-325 mg per tablet; Take 1 tablet by mouth every 6 (six) hours as needed for moderate pain or severe pain Max Daily Amount: 4 tablets          Subjective:      Patient ID: Manuel Kendrick is a 28 y.o. male.    The patient has chronic low back pain.  Please see the visit from 2 weeks ago.  He is taking Percocet on average 3 a day sometimes 4 he is going to follow-up with the spine program.  He is tolerating the medicine fine.  I checked the PDMP site.  There is no signs or symptoms of abuse or misuse.  The medication is helping him with his flexibility mobility ability to work and get things done at home.  He has more motivation.        The following portions of the patient's history were reviewed and updated as appropriate: allergies, current medications, past family history, past medical history, past social history, past surgical history, and problem list.    Review of Systems      Objective:      /67 (BP Location: Left arm, Patient Position: Sitting, Cuff Size: Large)   Pulse 77   Ht 6' (1.829 m)   Wt 93.2 kg (205 lb 6.4 oz)   SpO2 98%   BMI 27.86 kg/m²          Physical Exam

## 2024-03-22 NOTE — TELEPHONE ENCOUNTER
PA for oxyCODONE-acetaminophen (Percocet) 5-325 mg     Submitted via    [x]CMM-KEY [id number]  []Surescripts-Case ID #   []Faxed to plan   []Other website   []Phone call Case ID #     Office notes sent, clinical questions answered. Awaiting determination    Turnaround time for your insurance to make a decision on your Prior Authorization can take 7-21 business days.

## 2024-03-22 NOTE — TELEPHONE ENCOUNTER
Patient called Rx refill line checking on status for PA on his medication. Advised it was sent to the PA team and could take at least a week for determination from his insurance company, also that he will be notified when we get determination.

## 2024-03-25 ENCOUNTER — TELEPHONE (OUTPATIENT)
Age: 29
End: 2024-03-25

## 2024-03-25 NOTE — TELEPHONE ENCOUNTER
Rupali called requesting a diagnosis for the medication oxyCODONE-acetaminophen (Percocet) 5-325 mg per tablet. I advised the Associated Diagnoses: Lumbar disc herniation [M51.26] from the prescription.

## 2024-03-25 NOTE — TELEPHONE ENCOUNTER
PA for oxyCODONE-acetaminophen (Percocet) 5-325 mg  Approved   Date(s) approved 3/22/24-9/18/24  Case #    Patient advised by [] Door 6hart Message                      [x] Phone call       Pharmacy advised by [x]Fax                                     []Phone call    Approval letter scanned into Media Yes

## 2024-04-17 ENCOUNTER — OFFICE VISIT (OUTPATIENT)
Dept: FAMILY MEDICINE CLINIC | Facility: CLINIC | Age: 29
End: 2024-04-17
Payer: COMMERCIAL

## 2024-04-17 VITALS
BODY MASS INDEX: 27.52 KG/M2 | HEIGHT: 72 IN | WEIGHT: 203.2 LBS | DIASTOLIC BLOOD PRESSURE: 73 MMHG | HEART RATE: 80 BPM | OXYGEN SATURATION: 98 % | SYSTOLIC BLOOD PRESSURE: 131 MMHG

## 2024-04-17 DIAGNOSIS — M51.9 LUMBAR DISC DISEASE: Primary | ICD-10-CM

## 2024-04-17 DIAGNOSIS — M51.26 LUMBAR DISC HERNIATION: ICD-10-CM

## 2024-04-17 PROCEDURE — 99213 OFFICE O/P EST LOW 20 MIN: CPT | Performed by: FAMILY MEDICINE

## 2024-04-17 RX ORDER — OXYCODONE HYDROCHLORIDE AND ACETAMINOPHEN 5; 325 MG/1; MG/1
1 TABLET ORAL EVERY 6 HOURS PRN
Qty: 120 TABLET | Refills: 0 | Status: SHIPPED | OUTPATIENT
Start: 2024-04-17 | End: 2024-04-18 | Stop reason: SDUPTHER

## 2024-04-17 NOTE — PROGRESS NOTES
Assessment/Plan:       1. Lumbar disc disease  Comments:  continue medication  ref to spine program  Orders:  -     Ambulatory Referral to Comprehensive Spine Program; Future    2. Lumbar disc herniation  Comments:  ref to spine program  increase percocet quantity  Orders:  -     oxyCODONE-acetaminophen (Percocet) 5-325 mg per tablet; Take 1 tablet by mouth every 6 (six) hours as needed for moderate pain or severe pain Max Daily Amount: 4 tablets          Subjective:      Patient ID: Manuel Kendrick is a 28 y.o. male.    Manuel is still struggling with a lot of low back pain left > right sided.  He has sciatica.  He has a lumbar herniation on an mri from last year.  The percocet helps.  Will refer to spine program.  He had seen neurosurgery at [facility] in the past.        The following portions of the patient's history were reviewed and updated as appropriate: allergies, current medications, past family history, past medical history, past social history, past surgical history, and problem list.    Review of Systems   Respiratory: Negative.     Cardiovascular: Negative.          Objective:      /73 (BP Location: Right arm, Patient Position: Sitting, Cuff Size: Large)   Pulse 80   Ht 6' (1.829 m)   Wt 92.2 kg (203 lb 3.2 oz)   SpO2 98%   BMI 27.56 kg/m²          Physical Exam  Vitals and nursing note reviewed.   Constitutional:       Appearance: Normal appearance.   HENT:      Head: Normocephalic and atraumatic.      Nose: Nose normal.      Mouth/Throat:      Mouth: Mucous membranes are moist.   Eyes:      Extraocular Movements: Extraocular movements intact.      Pupils: Pupils are equal, round, and reactive to light.   Cardiovascular:      Rate and Rhythm: Normal rate and regular rhythm.      Pulses: Normal pulses.   Pulmonary:      Effort: Pulmonary effort is normal.      Breath sounds: Normal breath sounds.   Abdominal:      General: Bowel sounds are normal.      Palpations: Abdomen is soft.   Musculoskeletal:       Cervical back: Normal range of motion.      Comments: B/l lumbar spasm   Skin:     General: Skin is warm and dry.      Capillary Refill: Capillary refill takes less than 2 seconds.   Neurological:      General: No focal deficit present.      Mental Status: He is alert.   Psychiatric:         Mood and Affect: Mood normal.

## 2024-04-18 DIAGNOSIS — M51.26 LUMBAR DISC HERNIATION: ICD-10-CM

## 2024-04-18 RX ORDER — OXYCODONE HYDROCHLORIDE AND ACETAMINOPHEN 5; 325 MG/1; MG/1
1 TABLET ORAL EVERY 6 HOURS PRN
Qty: 120 TABLET | Refills: 0 | OUTPATIENT
Start: 2024-04-18

## 2024-04-18 RX ORDER — OXYCODONE HYDROCHLORIDE AND ACETAMINOPHEN 5; 325 MG/1; MG/1
1 TABLET ORAL EVERY 6 HOURS PRN
Qty: 120 TABLET | Refills: 0 | Status: SHIPPED | OUTPATIENT
Start: 2024-04-18

## 2024-04-18 NOTE — TELEPHONE ENCOUNTER
Patient calling to change pharmacy for       oxyCODONE-acetaminophen (Percocet) 5-325 mg per tablet Take 1 tablet by mouth every 6 (six) hours as needed for moderate pain or severe pain Max Daily Amount: 4 tablets        Please resend to     [id number] [address] [[id number]]

## 2024-04-18 NOTE — TELEPHONE ENCOUNTER
Patient called asking why request for prescription was denied, asked if someone could please call to discuss.

## 2024-04-19 ENCOUNTER — TELEPHONE (OUTPATIENT)
Dept: PHYSICAL THERAPY | Facility: OTHER | Age: 29
End: 2024-04-19

## 2024-04-25 ENCOUNTER — TELEPHONE (OUTPATIENT)
Age: 29
End: 2024-04-25

## 2024-04-26 NOTE — TELEPHONE ENCOUNTER
Patient called, request the status of Doctors Letter for days of absence, Upon chart review/messages, letters, confirmed previous messages regarding the matter, and confirmation of Doctors letter issued 04/25/2024. Patient is satisfied, Please advise patient at [phone], if any further questions.

## 2024-05-02 DIAGNOSIS — M51.9 LUMBAR DISC DISEASE: Primary | ICD-10-CM

## 2024-05-13 DIAGNOSIS — M51.26 LUMBAR DISC HERNIATION: ICD-10-CM

## 2024-05-14 RX ORDER — OXYCODONE HYDROCHLORIDE AND ACETAMINOPHEN 5; 325 MG/1; MG/1
1 TABLET ORAL EVERY 6 HOURS PRN
Qty: 120 TABLET | Refills: 0 | Status: SHIPPED | OUTPATIENT
Start: 2024-05-14

## 2024-06-14 ENCOUNTER — OFFICE VISIT (OUTPATIENT)
Dept: FAMILY MEDICINE CLINIC | Facility: CLINIC | Age: 29
End: 2024-06-14
Payer: COMMERCIAL

## 2024-06-14 VITALS
HEART RATE: 72 BPM | HEIGHT: 72 IN | WEIGHT: 199.6 LBS | OXYGEN SATURATION: 98 % | SYSTOLIC BLOOD PRESSURE: 128 MMHG | DIASTOLIC BLOOD PRESSURE: 78 MMHG | BODY MASS INDEX: 27.04 KG/M2

## 2024-06-14 DIAGNOSIS — M54.41 CHRONIC MIDLINE LOW BACK PAIN WITH BILATERAL SCIATICA: Primary | ICD-10-CM

## 2024-06-14 DIAGNOSIS — G89.29 CHRONIC MIDLINE LOW BACK PAIN WITH BILATERAL SCIATICA: Primary | ICD-10-CM

## 2024-06-14 DIAGNOSIS — M51.9 LUMBAR DISC DISEASE: ICD-10-CM

## 2024-06-14 DIAGNOSIS — M51.26 LUMBAR DISC HERNIATION: ICD-10-CM

## 2024-06-14 DIAGNOSIS — M54.42 CHRONIC MIDLINE LOW BACK PAIN WITH BILATERAL SCIATICA: Primary | ICD-10-CM

## 2024-06-14 PROCEDURE — 99213 OFFICE O/P EST LOW 20 MIN: CPT | Performed by: FAMILY MEDICINE

## 2024-06-14 RX ORDER — OXYCODONE HYDROCHLORIDE AND ACETAMINOPHEN 5; 325 MG/1; MG/1
1 TABLET ORAL EVERY 6 HOURS PRN
Qty: 120 TABLET | Refills: 0 | Status: SHIPPED | OUTPATIENT
Start: 2024-06-14

## 2024-06-14 NOTE — PROGRESS NOTES
Assessment/Plan:       1. Chronic midline low back pain with bilateral sciatica  Assessment & Plan:  Ref comp spine program  Percocet prn for now  Orders:  -     Ambulatory Referral to Comprehensive Spine Program; Future  2. Lumbar disc disease  -     Ambulatory Referral to Comprehensive Spine Program; Future        Subjective:      Patient ID: Manuel Kendrick is a 28 y.o. male.    The patient continues to have low back pain.  I did refer him to the comprehensive spine center and physical therapy.  I reviewed the PDMP and refilled this medication.  He needs to have an evaluation by a specialist for his pain and he is aware of that.  He does have FMLA forms for intermittently for flareups of his back pain to be filled out.      The following portions of the patient's history were reviewed and updated as appropriate: allergies, current medications, past family history, past medical history, past social history, past surgical history, and problem list.    Review of Systems   Respiratory: Negative.     Cardiovascular: Negative.          Objective:      /78 (BP Location: Right arm, Patient Position: Sitting, Cuff Size: Standard)   Pulse 72   Ht 6' (1.829 m)   Wt 90.5 kg (199 lb 9.6 oz)   SpO2 98%   BMI 27.07 kg/m²          Physical Exam  Vitals and nursing note reviewed.   Constitutional:       Appearance: Normal appearance.   HENT:      Head: Normocephalic and atraumatic.      Nose: Nose normal.      Mouth/Throat:      Mouth: Mucous membranes are moist.   Eyes:      Extraocular Movements: Extraocular movements intact.      Pupils: Pupils are equal, round, and reactive to light.   Cardiovascular:      Rate and Rhythm: Normal rate and regular rhythm.      Pulses: Normal pulses.   Pulmonary:      Effort: Pulmonary effort is normal.      Breath sounds: Normal breath sounds.   Abdominal:      General: Bowel sounds are normal.      Palpations: Abdomen is soft.   Musculoskeletal:      Cervical back: Normal range of  motion.      Comments: B/l lumbar spasm   Skin:     General: Skin is warm and dry.      Capillary Refill: Capillary refill takes less than 2 seconds.   Neurological:      General: No focal deficit present.      Mental Status: He is alert.   Psychiatric:         Mood and Affect: Mood normal.

## 2024-06-25 ENCOUNTER — OFFICE VISIT (OUTPATIENT)
Dept: URGENT CARE | Facility: CLINIC | Age: 29
End: 2024-06-25
Payer: COMMERCIAL

## 2024-06-25 ENCOUNTER — TELEPHONE (OUTPATIENT)
Dept: FAMILY MEDICINE CLINIC | Facility: CLINIC | Age: 29
End: 2024-06-25

## 2024-06-25 VITALS
SYSTOLIC BLOOD PRESSURE: 135 MMHG | BODY MASS INDEX: 26.74 KG/M2 | WEIGHT: 197.4 LBS | DIASTOLIC BLOOD PRESSURE: 91 MMHG | TEMPERATURE: 98.5 F | OXYGEN SATURATION: 97 % | RESPIRATION RATE: 18 BRPM | HEIGHT: 72 IN | HEART RATE: 87 BPM

## 2024-06-25 DIAGNOSIS — H15.003 BILATERAL SCLERITIS: Primary | ICD-10-CM

## 2024-06-25 PROCEDURE — 99213 OFFICE O/P EST LOW 20 MIN: CPT

## 2024-06-25 RX ORDER — NAPROXEN 500 MG/1
500 TABLET ORAL 2 TIMES DAILY WITH MEALS
Qty: 14 TABLET | Refills: 0 | Status: SHIPPED | OUTPATIENT
Start: 2024-06-25 | End: 2024-07-02

## 2024-06-25 RX ORDER — TOBRAMYCIN AND DEXAMETHASONE 3; 1 MG/ML; MG/ML
1 SUSPENSION/ DROPS OPHTHALMIC
Qty: 5 ML | Refills: 0 | Status: SHIPPED | OUTPATIENT
Start: 2024-06-25

## 2024-06-25 NOTE — PATIENT INSTRUCTIONS
Take tobradex as prescribed    Do not wear contact lenses for duration of treatment  Follow up with ophthalmologist within 24 hours  Tylenol/Ibuprofen for pain    Follow up with PCP in 3-5 days.  Proceed to  ER if symptoms worsen.    If tests are performed, our office will contact you with results only if changes need to made to the care plan discussed with you at the visit. You can review your full results on [facility] Mychart.

## 2024-06-25 NOTE — TELEPHONE ENCOUNTER
Pt is here in office was seen at [facility] today and they suggested pt have labs done for auto immune disease due to having issues with eyes. Can these labs be placed along with other lab orders. Please advise,

## 2024-06-25 NOTE — LETTER
June 25, 2024     Patient: Manuel Kendrick   YOB: 1995   Date of Visit: 6/25/2024       To Whom it May Concern:    Manuel Kendrick was seen in my clinic on 6/25/2024. He may return to work on 06/25/2024 .    If you have any questions or concerns, please don't hesitate to call.         Sincerely,          ELIZABETH Castro        CC: No Recipients

## 2024-06-26 DIAGNOSIS — H15.003 BILATERAL SCLERITIS: Primary | ICD-10-CM

## 2024-07-09 ENCOUNTER — OFFICE VISIT (OUTPATIENT)
Dept: FAMILY MEDICINE CLINIC | Facility: CLINIC | Age: 29
End: 2024-07-09
Payer: COMMERCIAL

## 2024-07-09 VITALS
SYSTOLIC BLOOD PRESSURE: 129 MMHG | OXYGEN SATURATION: 98 % | HEIGHT: 72 IN | HEART RATE: 57 BPM | WEIGHT: 197.8 LBS | BODY MASS INDEX: 26.79 KG/M2 | DIASTOLIC BLOOD PRESSURE: 67 MMHG

## 2024-07-09 DIAGNOSIS — H15.003 BILATERAL SCLERITIS: Primary | ICD-10-CM

## 2024-07-09 DIAGNOSIS — M51.26 LUMBAR DISC HERNIATION: ICD-10-CM

## 2024-07-09 PROCEDURE — 99213 OFFICE O/P EST LOW 20 MIN: CPT | Performed by: FAMILY MEDICINE

## 2024-07-09 RX ORDER — OXYCODONE HYDROCHLORIDE AND ACETAMINOPHEN 5; 325 MG/1; MG/1
1 TABLET ORAL EVERY 6 HOURS PRN
Qty: 120 TABLET | Refills: 0 | Status: SHIPPED | OUTPATIENT
Start: 2024-07-09

## 2024-07-09 NOTE — PROGRESS NOTES
Assessment/Plan:       1. Bilateral scleritis  Comments:  labs ordered  he will get them  2. Lumbar disc herniation  Comments:  ref to spine program  increase percocet quantity  Orders:  -     oxyCODONE-acetaminophen (Percocet) 5-325 mg per tablet; Take 1 tablet by mouth every 6 (six) hours as needed for moderate pain or severe pain Max Daily Amount: 4 tablets  -     Ambulatory Referral to Comprehensive Spine Program; Future        Subjective:      Patient ID: Nataliya Kendrick is a 28 y.o. male.    Patient is here to follow-up on his chronic low back.  He needs a refill on Percocet.  He is going to be scheduling with comprehensive spine program to see if there is anything else that can be done for his chronic low back pain and lumbar disc herniation.  He was recently seen in urgent care and diagnosed with bilateral scleritis.  I ordered an autoimmune laboratory workup panel for this and he has not done it yet but will get it done.  He needs forms filled out for work as well.        The following portions of the patient's history were reviewed and updated as appropriate: allergies, current medications, past family history, past medical history, past social history, past surgical history, and problem list.    Review of Systems      Objective:      /67 (BP Location: Left arm, Patient Position: Sitting, Cuff Size: Large)   Pulse 57   Ht 6' (1.829 m)   Wt 89.7 kg (197 lb 12.8 oz)   SpO2 98%   BMI 26.83 kg/m²          Physical Exam

## 2024-07-10 ENCOUNTER — TELEPHONE (OUTPATIENT)
Dept: PHYSICAL THERAPY | Facility: OTHER | Age: 29
End: 2024-07-10

## 2024-07-10 NOTE — TELEPHONE ENCOUNTER
Call placed to the patient per comprehensive Spine Program referral.    Spoke with patient, explained program and reason for the call.    Patient has a DIRECT PT SPINE REFERRAL FOR LUMBAR DDD since May 2024. Referral still active and pending. Patient is aware.    [place] [phone] and [place] [phone] PT sites phone number provided. Patient can call directly to one of the PT sites and schedule an evaluation.    CSP referral closed per protocol. (Duplicate).    Patient thanked me and I wished him a good day.

## 2024-07-12 ENCOUNTER — APPOINTMENT (OUTPATIENT)
Dept: LAB | Facility: CLINIC | Age: 29
End: 2024-07-12
Payer: COMMERCIAL

## 2024-07-12 DIAGNOSIS — H15.003 BILATERAL SCLERITIS: ICD-10-CM

## 2024-07-12 LAB
ALBUMIN SERPL BCG-MCNC: 4.6 G/DL (ref 3.5–5)
ALP SERPL-CCNC: 53 U/L (ref 34–104)
ALT SERPL W P-5'-P-CCNC: 31 U/L (ref 7–52)
ANA SER QL IA: NEGATIVE
ANION GAP SERPL CALCULATED.3IONS-SCNC: 12 MMOL/L (ref 4–13)
AST SERPL W P-5'-P-CCNC: 28 U/L (ref 13–39)
BILIRUB SERPL-MCNC: 0.67 MG/DL (ref 0.2–1)
BUN SERPL-MCNC: 8 MG/DL (ref 5–25)
CALCIUM SERPL-MCNC: 9.6 MG/DL (ref 8.4–10.2)
CHLORIDE SERPL-SCNC: 101 MMOL/L (ref 96–108)
CO2 SERPL-SCNC: 27 MMOL/L (ref 21–32)
CREAT SERPL-MCNC: 0.89 MG/DL (ref 0.6–1.3)
ERYTHROCYTE [DISTWIDTH] IN BLOOD BY AUTOMATED COUNT: 12.6 % (ref 11.6–15.1)
ERYTHROCYTE [SEDIMENTATION RATE] IN BLOOD: 20 MM/HOUR (ref 0–14)
GFR SERPL CREATININE-BSD FRML MDRD: 117 ML/MIN/1.73SQ M
GLUCOSE P FAST SERPL-MCNC: 96 MG/DL (ref 65–99)
HCT VFR BLD AUTO: 47.7 % (ref 36.5–49.3)
HGB BLD-MCNC: 15.8 G/DL (ref 12–17)
MCH RBC QN AUTO: 29.8 PG (ref 26.8–34.3)
MCHC RBC AUTO-ENTMCNC: 33.1 G/DL (ref 31.4–37.4)
MCV RBC AUTO: 90 FL (ref 82–98)
PLATELET # BLD AUTO: 265 THOUSANDS/UL (ref 149–390)
PMV BLD AUTO: 11.3 FL (ref 8.9–12.7)
POTASSIUM SERPL-SCNC: 4 MMOL/L (ref 3.5–5.3)
PROT SERPL-MCNC: 8 G/DL (ref 6.4–8.4)
RBC # BLD AUTO: 5.3 MILLION/UL (ref 3.88–5.62)
SODIUM SERPL-SCNC: 140 MMOL/L (ref 135–147)
URATE SERPL-MCNC: 7.4 MG/DL (ref 3.5–8.5)
WBC # BLD AUTO: 6.73 THOUSAND/UL (ref 4.31–10.16)

## 2024-07-12 PROCEDURE — 85027 COMPLETE CBC AUTOMATED: CPT

## 2024-07-12 PROCEDURE — 84550 ASSAY OF BLOOD/URIC ACID: CPT

## 2024-07-12 PROCEDURE — 85652 RBC SED RATE AUTOMATED: CPT

## 2024-07-12 PROCEDURE — 83520 IMMUNOASSAY QUANT NOS NONAB: CPT

## 2024-07-12 PROCEDURE — 86038 ANTINUCLEAR ANTIBODIES: CPT

## 2024-07-12 PROCEDURE — 86618 LYME DISEASE ANTIBODY: CPT

## 2024-07-12 PROCEDURE — 86037 ANCA TITER EACH ANTIBODY: CPT

## 2024-07-12 PROCEDURE — 86430 RHEUMATOID FACTOR TEST QUAL: CPT

## 2024-07-12 PROCEDURE — 36415 COLL VENOUS BLD VENIPUNCTURE: CPT

## 2024-07-12 PROCEDURE — 80053 COMPREHEN METABOLIC PANEL: CPT

## 2024-07-13 LAB
B BURGDOR IGG+IGM SER QL IA: NEGATIVE
RHEUMATOID FACT SER QL LA: NEGATIVE

## 2024-07-15 ENCOUNTER — TELEPHONE (OUTPATIENT)
Dept: FAMILY MEDICINE CLINIC | Facility: CLINIC | Age: 29
End: 2024-07-15

## 2024-07-15 NOTE — TELEPHONE ENCOUNTER
Pt returned call re: results. Read results verbatim from the provider:       The Lyme disease blood work and rheumatologic blood work is negative other than a nonspecific mildly elevated sed rate which just shows inflammation. No new orders     A. Relayed results to (patient/patient representative as listed on communication consent form) as per provider message. Patient/Patient Representative expressed understanding and did not have any further questions.

## 2024-07-15 NOTE — TELEPHONE ENCOUNTER
----- Message from Robert Budinetz, MD sent at 7/13/2024 11:22 AM EDT -----  The Lyme disease blood work and rheumatologic blood work is negative other than a nonspecific mildly elevated sed rate which just shows inflammation.  No new orders
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
## 2024-07-16 ENCOUNTER — EVALUATION (OUTPATIENT)
Dept: PHYSICAL THERAPY | Facility: CLINIC | Age: 29
End: 2024-07-16
Payer: COMMERCIAL

## 2024-07-16 DIAGNOSIS — M51.9 LUMBAR DISC DISEASE: ICD-10-CM

## 2024-07-16 PROCEDURE — 97162 PT EVAL MOD COMPLEX 30 MIN: CPT | Performed by: PHYSICAL THERAPIST

## 2024-07-16 PROCEDURE — 97535 SELF CARE MNGMENT TRAINING: CPT | Performed by: PHYSICAL THERAPIST

## 2024-07-17 NOTE — TELEPHONE ENCOUNTER
Pt called back in regards to my chart message for results. He thought this may have been a new message, looks like it was previous one for 7/15. Pt was already relayed results. No follow up questions at this time.

## 2024-07-18 LAB
C-ANCA TITR SER IF: NORMAL TITER
MYELOPEROXIDASE AB SER IA-ACNC: <0.2 UNITS (ref 0–0.9)
P-ANCA ATYPICAL TITR SER IF: NORMAL TITER
P-ANCA TITR SER IF: NORMAL TITER
PROTEINASE3 AB SER IA-ACNC: <0.2 UNITS (ref 0–0.9)

## 2024-07-23 LAB — HLA-B27 QL NAA+PROBE: NORMAL

## 2024-08-02 ENCOUNTER — TELEPHONE (OUTPATIENT)
Age: 29
End: 2024-08-02

## 2024-08-02 NOTE — TELEPHONE ENCOUNTER
Pt returned call stating missed a call from practice but didn't see any msg on chart. Warm transferred the call to practice was answered by Nathaly and took the patient call. Thanks

## 2024-08-08 ENCOUNTER — OFFICE VISIT (OUTPATIENT)
Dept: FAMILY MEDICINE CLINIC | Facility: CLINIC | Age: 29
End: 2024-08-08
Payer: COMMERCIAL

## 2024-08-08 VITALS
HEIGHT: 72 IN | DIASTOLIC BLOOD PRESSURE: 66 MMHG | WEIGHT: 197.6 LBS | BODY MASS INDEX: 26.76 KG/M2 | HEART RATE: 87 BPM | OXYGEN SATURATION: 99 % | SYSTOLIC BLOOD PRESSURE: 122 MMHG

## 2024-08-08 DIAGNOSIS — M51.9 LUMBAR DISC DISEASE: Primary | ICD-10-CM

## 2024-08-08 DIAGNOSIS — M51.26 LUMBAR DISC HERNIATION: ICD-10-CM

## 2024-08-08 PROCEDURE — 99214 OFFICE O/P EST MOD 30 MIN: CPT | Performed by: FAMILY MEDICINE

## 2024-08-08 RX ORDER — OXYCODONE HYDROCHLORIDE AND ACETAMINOPHEN 5; 325 MG/1; MG/1
1 TABLET ORAL EVERY 6 HOURS PRN
Qty: 120 TABLET | Refills: 0 | Status: SHIPPED | OUTPATIENT
Start: 2024-08-08

## 2024-08-08 NOTE — PROGRESS NOTES
Assessment/Plan:       1. Lumbar disc disease  -     Ambulatory referral to Spine & Pain Management; Future  2. Lumbar disc herniation  Comments:  ref to spine program  increase percocet quantity  Orders:  -     oxyCODONE-acetaminophen (Percocet) 5-325 mg per tablet; Take 1 tablet by mouth every 6 (six) hours as needed for moderate pain or severe pain Max Daily Amount: 4 tablets        Subjective:      Patient ID: Nataliya Kendrick is a 29 y.o. male.    Nataliya is here for f/u on his chronic low back pain and lumbar disc dz.  He had been to pain mgt in the past at [facility].  He was actually offered surgery.  He went to PT and that worsened his pain.  He is on percocet.  Will consider a pain mgt referral.        The following portions of the patient's history were reviewed and updated as appropriate: allergies, current medications, past family history, past medical history, past social history, past surgical history, and problem list.    Review of Systems   Respiratory: Negative.     Cardiovascular: Negative.          Objective:      /66 (BP Location: Right arm, Patient Position: Sitting, Cuff Size: Large)   Pulse 87   Ht 6' (1.829 m)   Wt 89.6 kg (197 lb 9.6 oz)   SpO2 99%   BMI 26.80 kg/m²          Physical Exam  Vitals and nursing note reviewed.   Constitutional:       Appearance: Normal appearance.   HENT:      Head: Normocephalic and atraumatic.      Nose: Nose normal.      Mouth/Throat:      Mouth: Mucous membranes are moist.   Eyes:      Extraocular Movements: Extraocular movements intact.      Pupils: Pupils are equal, round, and reactive to light.   Cardiovascular:      Rate and Rhythm: Normal rate and regular rhythm.      Pulses: Normal pulses.   Pulmonary:      Effort: Pulmonary effort is normal.      Breath sounds: Normal breath sounds.   Abdominal:      General: Bowel sounds are normal.      Palpations: Abdomen is soft.   Musculoskeletal:      Cervical back: Normal range of motion.      Comments: Lumbar  spasm b/l   Skin:     General: Skin is warm and dry.      Capillary Refill: Capillary refill takes less than 2 seconds.   Neurological:      General: No focal deficit present.      Mental Status: He is alert.   Psychiatric:         Mood and Affect: Mood normal.

## 2024-09-03 DIAGNOSIS — M51.26 LUMBAR DISC HERNIATION: ICD-10-CM

## 2024-09-04 RX ORDER — OXYCODONE AND ACETAMINOPHEN 5; 325 MG/1; MG/1
1 TABLET ORAL EVERY 6 HOURS PRN
Qty: 120 TABLET | Refills: 0 | Status: SHIPPED | OUTPATIENT
Start: 2024-09-04

## 2024-09-13 ENCOUNTER — CONSULT (OUTPATIENT)
Dept: PAIN MEDICINE | Facility: CLINIC | Age: 29
End: 2024-09-13
Payer: COMMERCIAL

## 2024-09-13 VITALS
HEIGHT: 72 IN | OXYGEN SATURATION: 95 % | SYSTOLIC BLOOD PRESSURE: 140 MMHG | BODY MASS INDEX: 27.22 KG/M2 | TEMPERATURE: 97.7 F | HEART RATE: 65 BPM | DIASTOLIC BLOOD PRESSURE: 88 MMHG | WEIGHT: 201 LBS | RESPIRATION RATE: 20 BRPM

## 2024-09-13 DIAGNOSIS — M47.26 OTHER SPONDYLOSIS WITH RADICULOPATHY, LUMBAR REGION: ICD-10-CM

## 2024-09-13 DIAGNOSIS — M51.9 LUMBAR DISC DISEASE: Primary | ICD-10-CM

## 2024-09-13 PROCEDURE — 99204 OFFICE O/P NEW MOD 45 MIN: CPT | Performed by: ANESTHESIOLOGY

## 2024-09-13 NOTE — PROGRESS NOTES
Assessment  1. Lumbar disc disease  -     Ambulatory referral to Spine & Pain Management  2. Other spondylosis with radiculopathy, lumbar region    Bilateral lumbar radicular pain in the L5 dermatomal distribution accompanied by pain limited weakness numbness and paresthesias.  Patient has not fully participated with PT. Chronic pain with decreased participation with IADLs over the past 3 months.  Has been taking OTC ibuprofen and tylenol in addition to oxycodone with modest benefit.  5/5 strength bilaterally, positive SLR bilaterally. Reflexes 2+.  Additionally there is positive facet loading, bilaterally. Denies any gait instability, saddle anesthesia. On MRI imaging at L5-S1: mild disc bulging eccentric toward the left side. There is very minimal   impression of the ventral thecal sac. Mild bilateral neural foraminal narrowing.  Risks, benefits alternatives epidural steroid injections thoroughly discussed with patient.  Handouts provided questions answered to patient's satisfaction.  Lifestyle modifications extensively discussed including diet, exercise and weight loss in addition to core strengthening.  Will proceed with multimodal pain therapy plan as noted below:    Plan  -L5-S1 LESI; f/u 2 weeks post procedure  -script provided for formal physical therapy for bilateral lumbar radiculopathy; Physician directed home exercise plan as per [employer] demonstrated and handouts provided that patient plans to participate with for 1 hour, twice a week for the next 6 weeks.     There are risks associated with opioid medications, including dependence, addiction and tolerance. The patient understands and agrees to use these medications only as prescribed. Potential side effects of the medications include, but are not limited to, constipation, drowsiness, addiction, impaired judgment and risk of fatal overdose if not taken as prescribed. The patient was warned against driving while taking sedation medications or operating  heavy machinery. The patient voiced understanding. Sharing medications is a felony. At this point in time, the patient is showing no signs of addiction, abuse, diversion or suicidal ideation.     [place] Prescription Drug Monitoring Program report was reviewed and was appropriate      Complete risks and benefits including bleeding, infection, tissue reaction, nerve injury and allergic reaction were discussed. The approach was demonstrated using models and literature was provided. Verbal and written consent was obtained.     My impressions and treatment recommendations were discussed in detail with the patient who verbalized understanding and had no further questions.  Discharge instructions were provided. I personally saw and examined the patient and I agree with the above discussed plan of care.    Review of external notes including PT notes, PCP notes and specialist notes was performed at this visit in addition to review of new ordered imaging and past imaging to develop or modify multidisciplinary pain plan    No orders of the defined types were placed in this encounter.      History of Present Illness    Nataliya Kendrick is a 29 y.o. male with pmhx of vaping presenting with chronic lumbar radicular pain in the bilateral L5 dermatomal distributions. Debilitating pain limited weakness numbness and paresthesias accompany the pain. The patient rates the pain at a 8-9/10 accompanied by electric shock-like shooting features and crampy burning pain in the aforementioned dermatomal distributions.  The pain is worse in the mornings as well as the end of the day; exertion such as walking for long periods of time seems to exacerbate the pain. He tries to maintain an active lifestyle and finds that the current degree of pain seems to compromises his efforts.  The pain significantly impacts independent activities of daily living and contributes to significant disability.  He has attempted physical therapy with exacerbation  of the pain (one visit).  He has taken nsaids, tylenol as well as gabapentin with limited relief of the pain as well.  He has never tried epidural steroid injections in the past. He denies any bowel or bladder dysfunction/incontinence, saddle anesthesia or gait instability.    I have personally reviewed and/or updated the patient's past medical history, past surgical history, family history, social history, current medications, allergies, and vital signs today.     Review of Systems   Constitutional:  Positive for activity change.   HENT: Negative.     Eyes: Negative.    Respiratory: Negative.     Cardiovascular: Negative.    Gastrointestinal: Negative.    Endocrine: Negative.    Genitourinary: Negative.    Musculoskeletal:  Positive for arthralgias, back pain, gait problem and myalgias.   Skin: Negative.    Allergic/Immunologic: Negative.    Neurological:  Positive for weakness and numbness.   Hematological: Negative.    Psychiatric/Behavioral: Negative.     All other systems reviewed and are negative.      Patient Active Problem List   Diagnosis    Closed fracture of distal end of right fibula with routine healing    Annual physical exam    Chronic midline low back pain with bilateral sciatica       Past Medical History:   Diagnosis Date    Fractures     Oth fracture of fifth lumbar vertebra, init for clos fx (HCC) 2015       Past Surgical History:   Procedure Laterality Date    CRANIOTOMY         Family History   Problem Relation Age of Onset    No Known Problems Mother     No Known Problems Father     No Known Problems Brother        Social History     Occupational History    Not on file   Tobacco Use    Smoking status: Former     Current packs/day: 0.00     Types: Cigarettes     Quit date: 2018     Years since quittin.7     Passive exposure: Never    Smokeless tobacco: Current   Vaping Use    Vaping status: Every Day    Start date: 2014    Substances: Nicotine   Substance and Sexual Activity     Alcohol use: Yes     Comment: occasionally    Drug use: Never    Sexual activity: Yes     Partners: Female       Current Outpatient Medications on File Prior to Visit   Medication Sig    oxyCODONE-acetaminophen (Percocet) 5-325 mg per tablet Take 1 tablet by mouth every 6 (six) hours as needed for moderate pain or severe pain Max Daily Amount: 4 tablets     No current facility-administered medications on file prior to visit.       No Known Allergies      Physical Exam    /88 (BP Location: Left arm, Patient Position: Sitting, Cuff Size: Adult)   Pulse 65   Temp 97.7 °F (36.5 °C)   Resp 20   Ht 6' (1.829 m)   Wt 91.2 kg (201 lb)   SpO2 95%   BMI 27.26 kg/m²     Constitutional: normal, well developed, well nourished, alert, in no distress and non-toxic and no overt pain behavior. and overweight  Eyes: anicteric  HEENT: grossly intact  Neck: supple, symmetric, trachea midline and no masses   Pulmonary:even and unlabored  Cardiovascular:No edema or pitting edema present  Skin:Normal without rashes or lesions and well hydrated  Psychiatric:Mood and affect appropriate  Neurologic:Cranial Nerves II-XII grossly intact Sensation grossly intact; no clonus negative ferguson's. Reflexes 2+ and brisk. SLR + bilaterally  Musculoskeletal:normal gait. 5/5 strength bilaterally with AROM in lower extremities. Normal heel toe and tip toe walking. Significant pain with lumbar facet loading bilaterally and with lateral spine rotation, ttp over lumbar paraspinal muscles. Negative lidia's test, negative gaenslen's negative SIJ loading bilaterally.    Imaging    MRI of the lumbar spine was obtained with the following sequences:   Sagittal T1, sagittal T2, sagittal STIR and axial T2 weighted images.     Comparison: Plain radiograph from 8/10/2021     Findings: For the purposes of this dictation, the lumbar vertebrae are labeled   from a caudal to cranial direction, the first vertebra with lumbar morphology is   labeled as L5.      Conus and lower thoracic cord: Conus medullaris is normal morphology,   terminating at the L1 level.     Marrow and Alignment: Mild straightening of the lumbar lordosis. No pathologic   edema within the marrow or within the ligaments. There is mild disc desiccation   and disc narrowing at all levels, greatest at the L5-S1 level.     L1-L2: Normal     L2-L3 : Normal     L3-L4: Normal     L4-L5: Minimal disc bulge. No significant spinal canal neural foraminal   narrowing.     L5-S1: Mild disc bulging eccentric toward the left side. There is very minimal   impression of the ventral thecal sac. Mild bilateral neural foraminal narrowing.

## 2024-09-13 NOTE — LETTER
September 13, 2024     Patient: Nataliya Kendrick  YOB: 1995  Date of Visit: 9/13/2024      To Whom it May Concern:    Nataliya Kendrick is under my professional care. Nataliya was seen in my office on 9/13/2024. Nataliya may return to work without limitations.    If you have any questions or concerns, please don't hesitate to call.         Sincerely,          Eliceo Blackman MD        CC:   No Recipients

## 2024-09-13 NOTE — PATIENT INSTRUCTIONS
Patient Education     Core Strengthening Exercises on Back or on Hands and Knees   About this topic   Your core muscles are in your chest, back, buttock, and stomach area. They are your abdominal, back, and pelvis muscles. These muscles help keep your body stable when using your arms or legs. They also help with balance and posture. There are many exercises you can do to keep these muscles strong.  If you have back problems like a compression fracture or a ruptured disc, doing some of these exercises could make your problem worse. Some of these exercises may cause lower back pain.  General   Before starting with a program, ask your doctor if you are healthy enough to do these exercises. Your doctor may have you work with a , chiropractor, or physical therapist to make a safe exercise program to meet your needs.  Strengthening Exercises   Strengthening exercises keep your muscles firm and strong. Start by repeating each exercise 2 to 3 times. Work up to doing each exercise 10 times. Try to do the exercises 2 to 3 times each day. Hold each exercise for 3 to 5 seconds. Do all exercises slowly.  Hip lifts ? Lie on your back with your knees bent and feet flat on the floor. Tighten your stomach muscles and push your heels into the floor to lift your buttocks off the floor. Relax.  Pelvic tilts ? Lie on your back with your knees bent and feet flat on the floor. Tighten your stomach muscles and press your lower back down to the floor. Relax.  Straight leg raises lying down ? Lie on your back with one leg straight. Bend your other knee so the foot is flat on the bed. Keeping your leg straight, lift the leg up to the level of your other knee. Lower it back down. Repeat with the other leg.  Knee flex lying down ? Lie on your back with both knees bent and your feet flat on the floor. Tighten your belly muscles. Raise one leg up and back down as if you are marching in slow motion. Keep belly muscles tight while you move  your leg. Switch legs. To make this exercise harder, raise both arms straight up in the air. Tighten your belly muscles. When you raise one leg up, reach the opposite arm over your head. Switch, moving the opposite arm and leg until you have done 10 repetitions on each side.  Abdominal crunches ? Lie on your back with both knees bent. Keep your feet flat on the floor. Place your hands in one of these positions. Try starting with the first position since it is the easiest. As you get better, use the other positions to make it harder.  Crunches with arms at sides.  Crunches with arms across chest.  Crunches with arms behind head. Be careful not to interlock your fingers behind your neck or head while doing crunches. This may add tension to your neck and cause strain.  Look at the ceiling. Tighten your belly muscles and lift your shoulders and upper back off the floor. Breathe out while you are doing this. Lower your shoulders to the floor. Breathe in while you are doing this. Relax your belly muscles all the way before starting another crunch.  Arm and leg lifts on hands and knees ? Start on your hands and knees. With all of these exercises, keep your back as level as possible. If you are having trouble with this, you may want to put a small object on your back such as a book. If it falls off, you are not keeping your back level enough during the exercise.  Lift one arm up to shoulder level and hold. Lower it back down. Now, lift up the other arm and hold.  Lift one leg up and kick it straight out until it is in line with your back and hold. Lower it back down. Now, lift up the other leg and hold.  Lift one arm and the OPPOSITE leg up at the same time and hold. Lower them down. Now, repeat using the other arm and leg. This is a very hard exercise. It may take time to be able to do this.               What will the results be?   Stronger core  Better balance  More toned belly and back muscles  Easier to do daily  activities  Better sports performance  Less low back pain  Helpful tips   Stay active and work out to keep your muscles strong and flexible.  Keep a healthy weight to avoid putting too much stress on your spine. Eat a healthy diet to keep your muscles healthy.  Be sure you do not hold your breath when exercising. This can raise your blood pressure. If you tend to hold your breath, try counting out loud when exercising. If any exercise bothers you, stop right away.  Try walking or cycling at an easy pace for a few minutes to warm up your muscles. Do this again after exercising.  Exercise may be slightly uncomfortable, but you should not have sharp pains. If you do get sharp pains, stop what you are doing. If the sharp pains continue, call your doctor.  Last Reviewed Date   2021-03-18  Consumer Information Use and Disclaimer   This generalized information is a limited summary of diagnosis, treatment, and/or medication information. It is not meant to be comprehensive and should be used as a tool to help the user understand and/or assess potential diagnostic and treatment options. It does NOT include all information about conditions, treatments, medications, side effects, or risks that may apply to a specific patient. It is not intended to be medical advice or a substitute for the medical advice, diagnosis, or treatment of a health care provider based on the health care provider's examination and assessment of a patient’s specific and unique circumstances. Patients must speak with a health care provider for complete information about their health, medical questions, and treatment options, including any risks or benefits regarding use of medications. This information does not endorse any treatments or medications as safe, effective, or approved for treating a specific patient. [employer] and its affiliates disclaim any warranty or liability relating to this information or the use thereof. The use of this information  is governed by the Terms of Use, available at https://www.[employer].com/en/know/clinical-effectiveness-terms   Copyright   Copyright © 2024 [employer], [employer] and its affiliates and/or licensors. All rights reserved.

## 2024-09-19 DIAGNOSIS — G89.29 CHRONIC MIDLINE LOW BACK PAIN WITH BILATERAL SCIATICA: Primary | ICD-10-CM

## 2024-09-19 DIAGNOSIS — M54.41 CHRONIC MIDLINE LOW BACK PAIN WITH BILATERAL SCIATICA: Primary | ICD-10-CM

## 2024-09-19 DIAGNOSIS — M54.42 CHRONIC MIDLINE LOW BACK PAIN WITH BILATERAL SCIATICA: Primary | ICD-10-CM

## 2024-09-19 RX ORDER — METHOCARBAMOL 500 MG/1
500 TABLET, FILM COATED ORAL EVERY 8 HOURS PRN
Qty: 30 TABLET | Refills: 1 | Status: SHIPPED | OUTPATIENT
Start: 2024-09-19

## 2024-09-19 RX ORDER — NAPROXEN 500 MG/1
500 TABLET ORAL 2 TIMES DAILY PRN
Qty: 30 TABLET | Refills: 1 | Status: SHIPPED | OUTPATIENT
Start: 2024-09-19

## 2024-09-27 DIAGNOSIS — M51.26 LUMBAR DISC HERNIATION: ICD-10-CM

## 2024-09-27 RX ORDER — OXYCODONE AND ACETAMINOPHEN 5; 325 MG/1; MG/1
1 TABLET ORAL EVERY 6 HOURS PRN
Qty: 120 TABLET | Refills: 0 | Status: SHIPPED | OUTPATIENT
Start: 2024-09-27

## 2024-10-25 DIAGNOSIS — M51.26 LUMBAR DISC HERNIATION: ICD-10-CM

## 2024-10-25 RX ORDER — OXYCODONE AND ACETAMINOPHEN 5; 325 MG/1; MG/1
1 TABLET ORAL EVERY 6 HOURS PRN
Qty: 120 TABLET | Refills: 0 | Status: SHIPPED | OUTPATIENT
Start: 2024-10-25

## 2024-11-06 ENCOUNTER — OFFICE VISIT (OUTPATIENT)
Dept: FAMILY MEDICINE CLINIC | Facility: CLINIC | Age: 29
End: 2024-11-06
Payer: COMMERCIAL

## 2024-11-06 VITALS
TEMPERATURE: 96.4 F | BODY MASS INDEX: 26.21 KG/M2 | OXYGEN SATURATION: 99 % | DIASTOLIC BLOOD PRESSURE: 80 MMHG | SYSTOLIC BLOOD PRESSURE: 114 MMHG | HEART RATE: 83 BPM | HEIGHT: 72 IN | WEIGHT: 193.5 LBS

## 2024-11-06 DIAGNOSIS — M54.50 CHRONIC MIDLINE LOW BACK PAIN WITHOUT SCIATICA: ICD-10-CM

## 2024-11-06 DIAGNOSIS — M54.41 CHRONIC MIDLINE LOW BACK PAIN WITH BILATERAL SCIATICA: Primary | ICD-10-CM

## 2024-11-06 DIAGNOSIS — M54.42 CHRONIC MIDLINE LOW BACK PAIN WITH BILATERAL SCIATICA: Primary | ICD-10-CM

## 2024-11-06 DIAGNOSIS — G89.29 CHRONIC MIDLINE LOW BACK PAIN WITH BILATERAL SCIATICA: Primary | ICD-10-CM

## 2024-11-06 DIAGNOSIS — G89.29 CHRONIC MIDLINE LOW BACK PAIN WITHOUT SCIATICA: ICD-10-CM

## 2024-11-06 PROCEDURE — 99214 OFFICE O/P EST MOD 30 MIN: CPT | Performed by: FAMILY MEDICINE

## 2024-11-06 RX ORDER — METHOCARBAMOL 500 MG/1
500 TABLET, FILM COATED ORAL EVERY 8 HOURS PRN
Qty: 30 TABLET | Refills: 1 | Status: SHIPPED | OUTPATIENT
Start: 2024-11-06

## 2024-11-06 NOTE — PROGRESS NOTES
Assessment/Plan:       1. Chronic midline low back pain with bilateral sciatica  Assessment & Plan:  Refer to physical therapy and add as needed Robaxin  Appreciate pain management consultation  Orders:  -     methocarbamol (ROBAXIN) 500 mg tablet; Take 1 tablet (500 mg total) by mouth every 8 (eight) hours as needed for muscle spasms  2. Chronic midline low back pain without sciatica  -     Ambulatory Referral to Physical Therapy; Future        Subjective:      Patient ID: Nataliya Kendrick is a 29 y.o. male.    The patient still has significant back pain intermittently but also at baseline he did see pain management they recommended physical therapy and an epidural steroid injection working to start with physical therapy.  We will then get an MRI.  He is taking the Percocet.  I am going to give him Robaxin to have on hand for flareups.        The following portions of the patient's history were reviewed and updated as appropriate: allergies, current medications, past family history, past medical history, past social history, past surgical history, and problem list.    Review of Systems   Respiratory: Negative.     Cardiovascular: Negative.    Gastrointestinal: Negative.          Objective:      /80 (BP Location: Left arm, Patient Position: Sitting, Cuff Size: Standard)   Pulse 83   Temp (!) 96.4 °F (35.8 °C) (Temporal)   Ht 6' (1.829 m)   Wt 87.8 kg (193 lb 8 oz)   SpO2 99%   BMI 26.24 kg/m²          Physical Exam  Vitals and nursing note reviewed.   Constitutional:       Appearance: Normal appearance.   HENT:      Head: Normocephalic and atraumatic.      Nose: Nose normal.      Mouth/Throat:      Mouth: Mucous membranes are moist.   Eyes:      Extraocular Movements: Extraocular movements intact.      Pupils: Pupils are equal, round, and reactive to light.   Cardiovascular:      Rate and Rhythm: Normal rate and regular rhythm.      Pulses: Normal pulses.   Pulmonary:      Effort: Pulmonary effort is  normal.      Breath sounds: Normal breath sounds.   Abdominal:      General: Bowel sounds are normal.      Palpations: Abdomen is soft.   Musculoskeletal:      Cervical back: Normal range of motion.      Comments: Lumbar spasm b/l   Skin:     General: Skin is warm and dry.      Capillary Refill: Capillary refill takes less than 2 seconds.   Neurological:      General: No focal deficit present.      Mental Status: He is alert.   Psychiatric:         Mood and Affect: Mood normal.

## 2024-11-18 ENCOUNTER — OFFICE VISIT (OUTPATIENT)
Dept: FAMILY MEDICINE CLINIC | Facility: CLINIC | Age: 29
End: 2024-11-18
Payer: COMMERCIAL

## 2024-11-18 VITALS
HEIGHT: 72 IN | SYSTOLIC BLOOD PRESSURE: 110 MMHG | WEIGHT: 185.8 LBS | TEMPERATURE: 97.8 F | OXYGEN SATURATION: 98 % | DIASTOLIC BLOOD PRESSURE: 64 MMHG | HEART RATE: 108 BPM | BODY MASS INDEX: 25.17 KG/M2

## 2024-11-18 DIAGNOSIS — R05.9 COUGH, UNSPECIFIED TYPE: Primary | ICD-10-CM

## 2024-11-18 LAB
SARS-COV-2 AG UPPER RESP QL IA: NEGATIVE
VALID CONTROL: NORMAL

## 2024-11-18 PROCEDURE — 87811 SARS-COV-2 COVID19 W/OPTIC: CPT | Performed by: FAMILY MEDICINE

## 2024-11-18 PROCEDURE — 99213 OFFICE O/P EST LOW 20 MIN: CPT | Performed by: FAMILY MEDICINE

## 2024-11-18 RX ORDER — AZITHROMYCIN 250 MG/1
TABLET, FILM COATED ORAL
Qty: 6 TABLET | Refills: 0 | Status: SHIPPED | OUTPATIENT
Start: 2024-11-18 | End: 2024-11-22

## 2024-11-18 NOTE — LETTER
November 18, 2024     Patient: Nataliya Kendrick  YOB: 1995  Date of Visit: 11/18/2024      To Whom it May Concern:    Nataliya Kendrick is under my professional care. Nataliya was seen in my office on 11/18/2024. Nataliya may return to work on 11/19/24 .please excuse 11/17 and 11/18    If you have any questions or concerns, please don't hesitate to call.         Sincerely,          Robert Budinetz, MD        CC: No Recipients

## 2024-11-18 NOTE — PROGRESS NOTES
Assessment/Plan:       1. Cough, unspecified type  -     POCT Rapid Covid Ag  -     azithromycin (ZITHROMAX) 250 mg tablet; Take 2 tablets today then 1 tablet daily x 4 days  Start a zpak      Subjective:      Patient ID: Nataliya Kendrick is a 29 y.o. male.    The patient is not feeling well for several days.  He is a sore throat and a productive cough with some aches and chills.  He is COVID test is negative    Sore Throat   Associated symptoms include coughing.   URI   Associated symptoms include coughing and a sore throat.       The following portions of the patient's history were reviewed and updated as appropriate: allergies, current medications, past family history, past medical history, past social history, past surgical history, and problem list.    Review of Systems   HENT:  Positive for sore throat.    Respiratory:  Positive for cough.    Cardiovascular: Negative.    Gastrointestinal: Negative.          Objective:      /64 (BP Location: Right arm, Patient Position: Sitting, Cuff Size: Standard)   Pulse (!) 108   Temp 97.8 °F (36.6 °C) (Oral)   Ht 6' (1.829 m)   Wt 84.3 kg (185 lb 12.8 oz)   SpO2 98%   BMI 25.20 kg/m²          Physical Exam  Vitals and nursing note reviewed.   Constitutional:       Appearance: Normal appearance.   HENT:      Head: Normocephalic and atraumatic.      Nose: Nose normal.      Mouth/Throat:      Mouth: Mucous membranes are moist.   Eyes:      Extraocular Movements: Extraocular movements intact.      Pupils: Pupils are equal, round, and reactive to light.   Cardiovascular:      Rate and Rhythm: Normal rate and regular rhythm.      Pulses: Normal pulses.   Pulmonary:      Effort: Pulmonary effort is normal.      Breath sounds: Normal breath sounds.   Abdominal:      General: Bowel sounds are normal.      Palpations: Abdomen is soft.   Musculoskeletal:      Cervical back: Normal range of motion.   Skin:     General: Skin is warm and dry.      Capillary Refill: Capillary  refill takes less than 2 seconds.   Neurological:      General: No focal deficit present.      Mental Status: He is alert.   Psychiatric:         Mood and Affect: Mood normal.

## 2024-11-22 DIAGNOSIS — M51.26 LUMBAR DISC HERNIATION: ICD-10-CM

## 2024-11-22 RX ORDER — OXYCODONE AND ACETAMINOPHEN 5; 325 MG/1; MG/1
1 TABLET ORAL EVERY 6 HOURS PRN
Qty: 120 TABLET | Refills: 0 | Status: SHIPPED | OUTPATIENT
Start: 2024-11-22

## 2024-12-16 DIAGNOSIS — M51.26 LUMBAR DISC HERNIATION: ICD-10-CM

## 2024-12-16 RX ORDER — OXYCODONE AND ACETAMINOPHEN 5; 325 MG/1; MG/1
1 TABLET ORAL EVERY 6 HOURS PRN
Qty: 120 TABLET | Refills: 0 | Status: SHIPPED | OUTPATIENT
Start: 2024-12-16

## 2025-01-16 DIAGNOSIS — M51.26 LUMBAR DISC HERNIATION: ICD-10-CM

## 2025-01-16 RX ORDER — OXYCODONE AND ACETAMINOPHEN 5; 325 MG/1; MG/1
1 TABLET ORAL EVERY 6 HOURS PRN
Qty: 120 TABLET | Refills: 0 | Status: SHIPPED | OUTPATIENT
Start: 2025-01-16

## 2025-01-21 ENCOUNTER — OFFICE VISIT (OUTPATIENT)
Dept: URGENT CARE | Facility: CLINIC | Age: 30
End: 2025-01-21
Payer: COMMERCIAL

## 2025-01-21 VITALS
OXYGEN SATURATION: 98 % | DIASTOLIC BLOOD PRESSURE: 93 MMHG | SYSTOLIC BLOOD PRESSURE: 134 MMHG | HEART RATE: 83 BPM | HEIGHT: 72 IN | BODY MASS INDEX: 25 KG/M2 | WEIGHT: 184.6 LBS | TEMPERATURE: 97.9 F | RESPIRATION RATE: 18 BRPM

## 2025-01-21 DIAGNOSIS — S05.02XA ABRASION OF LEFT CORNEA, INITIAL ENCOUNTER: Primary | ICD-10-CM

## 2025-01-21 PROCEDURE — 99213 OFFICE O/P EST LOW 20 MIN: CPT | Performed by: PHYSICIAN ASSISTANT

## 2025-01-21 RX ORDER — CIPROFLOXACIN HYDROCHLORIDE 3.5 MG/ML
1 SOLUTION/ DROPS TOPICAL
Qty: 5 ML | Refills: 0 | Status: SHIPPED | OUTPATIENT
Start: 2025-01-21

## 2025-01-21 NOTE — LETTER
January 21, 2025     Patient: Nataliya Kendrick   YOB: 1995   Date of Visit: 1/21/2025       To Whom it May Concern:    Nataliya Kendrick was seen in my clinic on 1/21/2025. He may return to work on 1/22/2025 .    If you have any questions or concerns, please don't hesitate to call.         Sincerely,          Juan Pink Jr PAPaulC        CC: No Recipients

## 2025-01-21 NOTE — PROGRESS NOTES
[facility]        NAME: Nataliya Kendrick is a 29 y.o. male  : 1995    MRN: [id number]  DATE: 2025  TIME: 9:50 AM    /93   Pulse 83   Temp 97.9 °F (36.6 °C)   Resp 18   Ht 6' (1.829 m)   Wt 83.7 kg (184 lb 9.6 oz)   SpO2 98%   BMI 25.04 kg/m²     Assessment and Plan   Abrasion of left cornea, initial encounter [S05.02XA]  1. Abrasion of left cornea, initial encounter  ciprofloxacin (CILOXAN) 0.3 % ophthalmic solution    fluorescein sodium sterile 1 mg ophthalmic strip 1 strip            Patient Instructions       Follow up with PCP in 3-5 days.  Proceed to  ER if symptoms worsen.    Chief Complaint     Chief Complaint   Patient presents with    Eye Problem     Left eye irritation starting this morning. Put contacts in eye and started with irritation on the way to work. Removed contacts but eyes continued to feel irritated and watery.          History of Present Illness       Pt with left eye irritation after putting contact lens in earlier today     Eye Problem   Associated symptoms include eye redness.       Review of Systems   Review of Systems   Constitutional: Negative.    HENT: Negative.     Eyes:  Positive for redness. Negative for visual disturbance.   Respiratory: Negative.     Cardiovascular: Negative.    Gastrointestinal: Negative.    Endocrine: Negative.    Genitourinary: Negative.    Musculoskeletal: Negative.    Skin: Negative.    Allergic/Immunologic: Negative.    Neurological: Negative.    Hematological: Negative.    Psychiatric/Behavioral: Negative.     All other systems reviewed and are negative.        Current Medications       Current Outpatient Medications:     ciprofloxacin (CILOXAN) 0.3 % ophthalmic solution, Administer 1 drop into the left eye every 2 (two) hours, Disp: 5 mL, Rfl: 0    methocarbamol (ROBAXIN) 500 mg tablet, Take 1 tablet (500 mg total) by mouth every 8 (eight) hours as needed for muscle spasms, Disp: 30 tablet, Rfl: 1     oxyCODONE-acetaminophen (Percocet) 5-325 mg per tablet, Take 1 tablet by mouth every 6 (six) hours as needed for moderate pain or severe pain Max Daily Amount: 4 tablets, Disp: 120 tablet, Rfl: 0    Current Facility-Administered Medications:     fluorescein sodium sterile 1 mg ophthalmic strip 1 strip, 1 strip, Left Eye, Once,     Current Allergies     Allergies as of 01/21/2025    (No Known Allergies)            The following portions of the patient's history were reviewed and updated as appropriate: allergies, current medications, past family history, past medical history, past social history, past surgical history and problem list.     Past Medical History:   Diagnosis Date    Fractures     Oth fracture of fifth lumbar vertebra, init for clos fx ([facility]) 07/25/2015       Past Surgical History:   Procedure Laterality Date    CRANIOTOMY         Family History   Problem Relation Age of Onset    No Known Problems Mother     No Known Problems Father     No Known Problems Brother          Medications have been verified.        Objective   /93   Pulse 83   Temp 97.9 °F (36.6 °C)   Resp 18   Ht 6' (1.829 m)   Wt 83.7 kg (184 lb 9.6 oz)   SpO2 98%   BMI 25.04 kg/m²        Physical Exam     Physical Exam  Vitals and nursing note reviewed.   Constitutional:       Appearance: Normal appearance. He is normal weight.      Comments: Left 20/25  right 20/25   HENT:      Head: Normocephalic and atraumatic.      Right Ear: Tympanic membrane, ear canal and external ear normal.      Left Ear: Tympanic membrane, ear canal and external ear normal.      Nose: Nose normal.      Mouth/Throat:      Mouth: Mucous membranes are moist.      Pharynx: Oropharynx is clear.   Eyes:      Extraocular Movements: Extraocular movements intact.      Pupils: Pupils are equal, round, and reactive to light.      Comments: Conj injected left  + red reflex anterior chamber wnl   +superficial superior fluorosceine uptake   no f/o   under either  lid,  lids wnl    Cardiovascular:      Rate and Rhythm: Normal rate and regular rhythm.      Pulses: Normal pulses.      Heart sounds: Normal heart sounds.   Pulmonary:      Effort: Pulmonary effort is normal.      Breath sounds: Normal breath sounds.   Abdominal:      Palpations: Abdomen is soft.   Musculoskeletal:         General: Normal range of motion.      Cervical back: Normal range of motion and neck supple.   Skin:     General: Skin is warm.      Capillary Refill: Capillary refill takes less than 2 seconds.   Neurological:      Mental Status: He is alert and oriented to person, place, and time.   Psychiatric:         Behavior: Behavior normal.

## 2025-01-23 ENCOUNTER — OFFICE VISIT (OUTPATIENT)
Age: 30
End: 2025-01-23
Payer: COMMERCIAL

## 2025-01-23 ENCOUNTER — APPOINTMENT (OUTPATIENT)
Dept: URGENT CARE | Facility: CLINIC | Age: 30
End: 2025-01-23
Payer: COMMERCIAL

## 2025-01-23 VITALS
DIASTOLIC BLOOD PRESSURE: 86 MMHG | TEMPERATURE: 99.3 F | HEIGHT: 72 IN | BODY MASS INDEX: 25.06 KG/M2 | OXYGEN SATURATION: 96 % | HEART RATE: 96 BPM | SYSTOLIC BLOOD PRESSURE: 132 MMHG | WEIGHT: 185 LBS | RESPIRATION RATE: 18 BRPM

## 2025-01-23 DIAGNOSIS — S05.02XA CORNEAL ABRASION, BILATERAL, INITIAL ENCOUNTER: ICD-10-CM

## 2025-01-23 DIAGNOSIS — S05.01XA CORNEAL ABRASION, BILATERAL, INITIAL ENCOUNTER: ICD-10-CM

## 2025-01-23 DIAGNOSIS — H57.9 BILATERAL EYE COMPLAINT: Primary | ICD-10-CM

## 2025-01-23 PROCEDURE — 99213 OFFICE O/P EST LOW 20 MIN: CPT

## 2025-01-23 RX ORDER — TETRACAINE HYDROCHLORIDE 5 MG/ML
2 SOLUTION OPHTHALMIC ONCE
Status: COMPLETED | OUTPATIENT
Start: 2025-01-23 | End: 2025-01-23

## 2025-01-23 RX ADMIN — TETRACAINE HYDROCHLORIDE 2 DROP: 5 SOLUTION OPHTHALMIC at 19:29

## 2025-01-23 NOTE — LETTER
January 23, 2025     Patient: Nataliya Kendrick   YOB: 1995   Date of Visit: 1/23/2025       To Whom it May Concern:    Nataliya Kendrick was seen in my clinic on 1/23/2025. Please excuse from work until either the pain in his eyes have improved or he has seen an eye doctor and receives further instructions.    If you have any questions or concerns, please don't hesitate to call.         Sincerely,          ELIZABETH Vergara        CC: No Recipients

## 2025-01-24 NOTE — PROGRESS NOTES
[facility]        NAME: Nataliya Kendrick is a 29 y.o. male  : 1995    MRN: [id number]  DATE: 2025  TIME: 8:08 PM    Assessment and Plan   Bilateral eye complaint [H57.9]  1. Bilateral eye complaint  fluorescein sodium sterile 1 mg ophthalmic strip 2 strip    tetracaine 0.5 % ophthalmic solution 2 drop      2. Corneal abrasion, bilateral, initial encounter              Patient Instructions   You appear to have corneal abrasion in the shape of the contact lens edge in both eyes due to uptake of the dye in both eyes.  Do not wear contact lenses until you are re-evaluated by an eye doctor.    I want you to continue using the Ciprofloxacin in both eyes that was prescribed to you 2 days ago.   Ok to take Ibuprofen for pain.      Proceed to Emergency Department if symptoms worsen.    If tests have been performed at [facility], our office will contact you with results if changes need to be made to the care plan discussed with you at the visit.  [facility]    Chief Complaint     Chief Complaint   Patient presents with   • Eye Problem     C/o eye irritation and pain in both eyes, worse in right eye.  Pt. Was seen several day ago for abrasion in left eye and had slight relief yesterday, started with pain again today, pt. Did open new contacts today before pain started.          History of Present Illness       Patient reports irritation in both eyes that is worse on the right side compared to the left starting today after putting in new contact lenses.  States it happened about 2 days ago and was told he had a corneal abrasion for which he was using ciprofloxacin eyedrops as prescribed In his left eye.  States today he used a brand-new pair of contact lenses and shortly after developed pain and redness in both eyes again.  He is unsure if it is related to contact lenses which she has been using for the same manufacture for many years or whether over there was  some contact solution that he used to cause irritation to his eyes.  He reports that he did take out the contact lenses afterwards and has been wearing glasses since.        Review of Systems   Review of Systems   Constitutional:  Negative for fever.   Eyes:  Positive for photophobia, pain and redness.   Respiratory:  Negative for shortness of breath.    Neurological:  Negative for dizziness and headaches.         Current Medications       Current Outpatient Medications:   •  ciprofloxacin (CILOXAN) 0.3 % ophthalmic solution, Administer 1 drop into the left eye every 2 (two) hours, Disp: 5 mL, Rfl: 0  •  methocarbamol (ROBAXIN) 500 mg tablet, Take 1 tablet (500 mg total) by mouth every 8 (eight) hours as needed for muscle spasms, Disp: 30 tablet, Rfl: 1  •  oxyCODONE-acetaminophen (Percocet) 5-325 mg per tablet, Take 1 tablet by mouth every 6 (six) hours as needed for moderate pain or severe pain Max Daily Amount: 4 tablets, Disp: 120 tablet, Rfl: 0  No current facility-administered medications for this visit.    Current Allergies     Allergies as of 01/23/2025   • (No Known Allergies)            The following portions of the patient's history were reviewed and updated as appropriate: allergies, current medications, past family history, past medical history, past social history, past surgical history and problem list.     Past Medical History:   Diagnosis Date   • Fractures    • Oth fracture of fifth lumbar vertebra, init for clos fx ([facility]) 07/25/2015       Past Surgical History:   Procedure Laterality Date   • CRANIOTOMY         Family History   Problem Relation Age of Onset   • No Known Problems Mother    • No Known Problems Father    • No Known Problems Brother          Medications have been verified.        Objective   /86 (BP Location: Left arm, Patient Position: Sitting)   Pulse 96   Temp 99.3 °F (37.4 °C)   Resp 18   Ht 6' (1.829 m)   Wt 83.9 kg (185 lb)   SpO2 96%   BMI 25.09 kg/m²   No LMP for  male patient.       Physical Exam     Physical Exam  Vitals and nursing note reviewed.   Constitutional:       Appearance: Normal appearance.   HENT:      Head: Normocephalic and atraumatic.   Eyes:      Conjunctiva/sclera:      Right eye: Right conjunctiva is injected.      Left eye: Left conjunctiva is injected.     Pulmonary:      Effort: Pulmonary effort is normal.   Skin:     General: Skin is warm and dry.      Capillary Refill: Capillary refill takes less than 2 seconds.   Neurological:      General: No focal deficit present.      Mental Status: He is alert and oriented to person, place, and time. Mental status is at baseline.      Sensory: No sensory deficit.      Motor: No weakness.   Psychiatric:         Mood and Affect: Mood normal.         Behavior: Behavior normal.         Thought Content: Thought content normal.

## 2025-01-24 NOTE — PATIENT INSTRUCTIONS
You appear to have corneal abrasion in the shape of the contact lens edge in both eyes due to uptake of the dye in both eyes.  Do not wear contact lenses until you are re-evaluated by an eye doctor.    I want you to continue using the Ciprofloxacin in both eyes that was prescribed to you 2 days ago.   Ok to take Ibuprofen for pain.      Proceed to Emergency Department if symptoms worsen.    If tests have been performed at [facility], our office will contact you with results if changes need to be made to the care plan discussed with you at the visit.  You can review your full results on [facility] MyChart.

## 2025-02-04 ENCOUNTER — OFFICE VISIT (OUTPATIENT)
Dept: FAMILY MEDICINE CLINIC | Facility: CLINIC | Age: 30
End: 2025-02-04
Payer: COMMERCIAL

## 2025-02-04 VITALS
SYSTOLIC BLOOD PRESSURE: 128 MMHG | BODY MASS INDEX: 25.33 KG/M2 | DIASTOLIC BLOOD PRESSURE: 82 MMHG | OXYGEN SATURATION: 98 % | HEIGHT: 72 IN | HEART RATE: 67 BPM | WEIGHT: 187 LBS

## 2025-02-04 DIAGNOSIS — M54.41 CHRONIC MIDLINE LOW BACK PAIN WITH BILATERAL SCIATICA: ICD-10-CM

## 2025-02-04 DIAGNOSIS — G89.29 CHRONIC MIDLINE LOW BACK PAIN WITH BILATERAL SCIATICA: ICD-10-CM

## 2025-02-04 DIAGNOSIS — M54.42 CHRONIC MIDLINE LOW BACK PAIN WITH BILATERAL SCIATICA: ICD-10-CM

## 2025-02-04 DIAGNOSIS — M54.50 CHRONIC LOW BACK PAIN, UNSPECIFIED BACK PAIN LATERALITY, UNSPECIFIED WHETHER SCIATICA PRESENT: Primary | ICD-10-CM

## 2025-02-04 DIAGNOSIS — G89.29 CHRONIC LOW BACK PAIN, UNSPECIFIED BACK PAIN LATERALITY, UNSPECIFIED WHETHER SCIATICA PRESENT: Primary | ICD-10-CM

## 2025-02-04 PROCEDURE — 99214 OFFICE O/P EST MOD 30 MIN: CPT | Performed by: FAMILY MEDICINE

## 2025-02-04 NOTE — PROGRESS NOTES
Name: Nataliya Kendrick      : 1995      MRN: [id number]  Encounter Provider: Robert Budinetz, MD  Encounter Date: 2025   Encounter department: [facility]  :  Assessment & Plan  Chronic low back pain, unspecified back pain laterality, unspecified whether sciatica present    Orders:    [facility] All Prescribed Meds and Special Instructions    Amphetamines, Methamphetamines    Butalbital    Phenobarbital    Secobarbital    Alprazolam    Clonazepam    Diazepam, Temazepam, Oxazepam    Lorazepam    Gabapentin    Pregabalin    Cocaine    Heroin    Buprenorphine    Levorphanol    Meperidine    Naltrexone    Fentanyl    Methadone    Oxycodone    Tapentadol    THC    Tramadol    Codeine, Hydrocodone, Hydropmorphone, Morphine    Bath Salts    Ethyl Glucuronide/Ethyl Sulfate    Kratom    Spice    Methylphenidate    Phentermine    Validity Oxidant    Validity Creatinine    Validity pH    Validity Specific    Xylazine Definitive Test    MRI lumbar spine wo contrast; Future    Chronic midline low back pain with bilateral sciatica    Orders:    MRI lumbar spine wo contrast; Future           History of Present Illness   F/u on chronic neck and low back pain.  He does daily stretches and exercises.  Mri of his spine was declined by insurance.  Mri from  reviewed.  He saw pain mgt a few months ago.  They discussed PT and/or GIDEON.  He is due for a UDS today.      Review of Systems   Respiratory: Negative.     Cardiovascular: Negative.        Objective   /82 (BP Location: Left arm, Patient Position: Sitting, Cuff Size: Standard)   Pulse 67   Ht 6' (1.829 m)   Wt 84.8 kg (187 lb)   SpO2 98%   BMI 25.36 kg/m²      Physical Exam  Vitals and nursing note reviewed.   Constitutional:       General: He is not in acute distress.     Appearance: He is well-developed.   HENT:      Head: Normocephalic and atraumatic.   Eyes:      Conjunctiva/sclera: Conjunctivae normal.   Cardiovascular:      Rate and  Rhythm: Normal rate and regular rhythm.      Heart sounds: No murmur heard.  Pulmonary:      Effort: Pulmonary effort is normal. No respiratory distress.      Breath sounds: Normal breath sounds.   Abdominal:      Palpations: Abdomen is soft.      Tenderness: There is no abdominal tenderness.   Musculoskeletal:         General: No swelling.      Cervical back: Neck supple.   Skin:     General: Skin is warm and dry.      Capillary Refill: Capillary refill takes less than 2 seconds.   Neurological:      Mental Status: He is alert.   Psychiatric:         Mood and Affect: Mood normal.

## 2025-02-10 DIAGNOSIS — M51.26 LUMBAR DISC HERNIATION: ICD-10-CM

## 2025-02-11 RX ORDER — OXYCODONE AND ACETAMINOPHEN 5; 325 MG/1; MG/1
1 TABLET ORAL EVERY 6 HOURS PRN
Qty: 120 TABLET | Refills: 0 | Status: SHIPPED | OUTPATIENT
Start: 2025-02-11

## 2025-02-12 ENCOUNTER — TELEPHONE (OUTPATIENT)
Dept: FAMILY MEDICINE CLINIC | Facility: CLINIC | Age: 30
End: 2025-02-12

## 2025-02-12 NOTE — TELEPHONE ENCOUNTER
[facility]  also called regarding pts screening asking which labs should be run as there was multiple ordered for this pt as well. I explained to  that we preformed a buckle swab on this patient as well and that any urine order should be disregarded.  understood and would call back with any other questions or concerns.

## 2025-03-05 DIAGNOSIS — M51.26 LUMBAR DISC HERNIATION: ICD-10-CM

## 2025-03-05 RX ORDER — OXYCODONE AND ACETAMINOPHEN 5; 325 MG/1; MG/1
1 TABLET ORAL EVERY 6 HOURS PRN
Qty: 120 TABLET | Refills: 0 | Status: SHIPPED | OUTPATIENT
Start: 2025-03-05

## 2025-03-16 ENCOUNTER — OFFICE VISIT (OUTPATIENT)
Dept: URGENT CARE | Facility: CLINIC | Age: 30
End: 2025-03-16
Payer: COMMERCIAL

## 2025-03-16 VITALS
SYSTOLIC BLOOD PRESSURE: 146 MMHG | RESPIRATION RATE: 16 BRPM | OXYGEN SATURATION: 97 % | TEMPERATURE: 99.1 F | HEART RATE: 93 BPM | DIASTOLIC BLOOD PRESSURE: 92 MMHG

## 2025-03-16 DIAGNOSIS — J06.9 VIRAL URI WITH COUGH: Primary | ICD-10-CM

## 2025-03-16 PROCEDURE — 99213 OFFICE O/P EST LOW 20 MIN: CPT

## 2025-03-16 RX ORDER — DEXTROMETHORPHAN HYDROBROMIDE AND PROMETHAZINE HYDROCHLORIDE 15; 6.25 MG/5ML; MG/5ML
5 SYRUP ORAL 4 TIMES DAILY PRN
Qty: 180 ML | Refills: 0 | Status: SHIPPED | OUTPATIENT
Start: 2025-03-16

## 2025-03-16 NOTE — PROGRESS NOTES
[facility]        NAME: Nataliya Kendrick is a 29 y.o. male  : 1995    MRN: [id number]  DATE: 2025  TIME: 3:20 PM    Assessment and Plan   Viral URI with cough [J06.9]  1. Viral URI with cough  promethazine-dextromethorphan (PHENERGAN-DM) 6.25-15 mg/5 mL oral syrup            Patient Instructions     Take Phenergan as needed for cough  Vitamin D3 2000 IU daily  Vitamin C 1000mg twice per day  Multivitamin daily  Some studies suggest that Zinc 12.5-15mg every 2 hours while awake x 5 days may shorten the duration cold symptoms by 1-2 days.   Fluids and rest  Nasal saline spray; Afrin if severe congestion (do not use for more than 3 days)  Over the counter decongestant  Tylenol/Ibuprofen for pain/fever  Salt water gargles and chloraseptic spray  Throat Coat Tea  Warm compresses over sinuses  Steam treatment (utilize proper safety precautions when in contact with hot water/steam)   Follow up with PCP in 3-5 days.  Proceed to  ER if symptoms worsen.    If tests are performed, our office will contact you with results only if changes need to made to the care plan discussed with you at the visit. You can review your full results on [facility].    Chief Complaint     Chief Complaint   Patient presents with    Cold Like Symptoms     Started 1 day ago  Sinus congestion, sinus drainage, coughing, non productive, and productive  OTC dayquil, and nyquil  Request note for work         History of Present Illness       URI   This is a new problem. The current episode started yesterday. Associated symptoms include congestion, coughing (productive) and rhinorrhea. Pertinent negatives include no chest pain, diarrhea, ear pain, headaches, nausea, sneezing, sore throat, vomiting or wheezing. Treatments tried: dayquil/nyquil.       Review of Systems   Review of Systems   Constitutional:  Positive for chills and diaphoresis. Negative for fatigue and fever.   HENT:  Positive for congestion, rhinorrhea and  sinus pressure. Negative for ear pain, postnasal drip, sneezing, sore throat and tinnitus.    Eyes:  Negative for photophobia, redness and itching.   Respiratory:  Positive for cough (productive). Negative for chest tightness, shortness of breath and wheezing.    Cardiovascular:  Negative for chest pain.   Gastrointestinal:  Negative for diarrhea, nausea and vomiting.   Skin:  Negative for color change and pallor.   Neurological:  Negative for dizziness, light-headedness and headaches.   Psychiatric/Behavioral:  Negative for confusion.          Current Medications       Current Outpatient Medications:     oxyCODONE-acetaminophen (Percocet) 5-325 mg per tablet, Take 1 tablet by mouth every 6 (six) hours as needed for moderate pain or severe pain Max Daily Amount: 4 tablets, Disp: 120 tablet, Rfl: 0    promethazine-dextromethorphan (PHENERGAN-DM) 6.25-15 mg/5 mL oral syrup, Take 5 mL by mouth 4 (four) times a day as needed for cough, Disp: 180 mL, Rfl: 0    ciprofloxacin (CILOXAN) 0.3 % ophthalmic solution, Administer 1 drop into the left eye every 2 (two) hours (Patient not taking: Reported on 3/16/2025), Disp: 5 mL, Rfl: 0    methocarbamol (ROBAXIN) 500 mg tablet, Take 1 tablet (500 mg total) by mouth every 8 (eight) hours as needed for muscle spasms (Patient not taking: Reported on 3/16/2025), Disp: 30 tablet, Rfl: 1    Current Allergies     Allergies as of 03/16/2025    (No Known Allergies)            The following portions of the patient's history were reviewed and updated as appropriate: allergies, current medications, past family history, past medical history, past social history, past surgical history and problem list.     Past Medical History:   Diagnosis Date    Fractures     Oth fracture of fifth lumbar vertebra, init for clos fx (HCC) 07/25/2015       Past Surgical History:   Procedure Laterality Date    CRANIOTOMY         Family History   Problem Relation Age of Onset    No Known Problems Mother     No  Known Problems Father     No Known Problems Brother          Medications have been verified.        Objective   /92   Pulse 93   Temp 99.1 °F (37.3 °C)   Resp 16   SpO2 97%        Physical Exam     Physical Exam  Constitutional:       Appearance: Normal appearance.   HENT:      Head: Normocephalic.      Right Ear: Tympanic membrane and external ear normal.      Left Ear: Tympanic membrane and external ear normal.      Nose: Congestion present.      Mouth/Throat:      Mouth: Mucous membranes are moist.      Pharynx: Oropharynx is clear.   Eyes:      Extraocular Movements: Extraocular movements intact.      Conjunctiva/sclera: Conjunctivae normal.      Pupils: Pupils are equal, round, and reactive to light.   Cardiovascular:      Rate and Rhythm: Normal rate and regular rhythm.      Pulses: Normal pulses.      Heart sounds: Normal heart sounds.   Pulmonary:      Effort: Pulmonary effort is normal. No respiratory distress.      Breath sounds: Normal breath sounds. No stridor. No wheezing, rhonchi or rales.   Musculoskeletal:      Cervical back: No tenderness.   Lymphadenopathy:      Cervical: No cervical adenopathy.   Skin:     General: Skin is warm and dry.   Neurological:      General: No focal deficit present.      Mental Status: He is alert and oriented to person, place, and time. Mental status is at baseline.   Psychiatric:         Mood and Affect: Mood normal.         Behavior: Behavior normal.         Thought Content: Thought content normal.         Judgment: Judgment normal.

## 2025-03-16 NOTE — PATIENT INSTRUCTIONS
Take Phenergan as needed for cough  Vitamin D3 2000 IU daily  Vitamin C 1000mg twice per day  Multivitamin daily  Some studies suggest that Zinc 12.5-15mg every 2 hours while awake x 5 days may shorten the duration cold symptoms by 1-2 days.   Fluids and rest  Nasal saline spray; Afrin if severe congestion (do not use for more than 3 days)  Over the counter decongestant  Tylenol/Ibuprofen for pain/fever  Salt water gargles and chloraseptic spray  Throat Coat Tea  Warm compresses over sinuses  Steam treatment (utilize proper safety precautions when in contact with hot water/steam)   Follow up with PCP in 3-5 days.  Proceed to  ER if symptoms worsen.    If tests are performed, our office will contact you with results only if changes need to made to the care plan discussed with you at the visit. You can review your full results on [facility].

## 2025-03-16 NOTE — LETTER
March 16, 2025     Patient: Nataliya Kendrick   YOB: 1995   Date of Visit: 3/16/2025       To Whom It May Concern:    It is my medical opinion that Nataliya Kendrick may return to work on 3/18/2025 .    If you have any questions or concerns, please don't hesitate to call.         Sincerely,        Abel Bhagat PA-C    CC: No Recipients

## 2025-04-03 DIAGNOSIS — M51.26 LUMBAR DISC HERNIATION: ICD-10-CM

## 2025-04-04 RX ORDER — OXYCODONE AND ACETAMINOPHEN 5; 325 MG/1; MG/1
1 TABLET ORAL EVERY 6 HOURS PRN
Qty: 120 TABLET | Refills: 0 | Status: SHIPPED | OUTPATIENT
Start: 2025-04-04

## 2025-04-29 DIAGNOSIS — M51.26 LUMBAR DISC HERNIATION: ICD-10-CM

## 2025-04-30 RX ORDER — OXYCODONE AND ACETAMINOPHEN 5; 325 MG/1; MG/1
1 TABLET ORAL EVERY 6 HOURS PRN
Qty: 120 TABLET | Refills: 0 | Status: SHIPPED | OUTPATIENT
Start: 2025-04-30

## 2025-05-05 ENCOUNTER — OFFICE VISIT (OUTPATIENT)
Dept: FAMILY MEDICINE CLINIC | Facility: CLINIC | Age: 30
End: 2025-05-05
Payer: COMMERCIAL

## 2025-05-05 VITALS
BODY MASS INDEX: 25.38 KG/M2 | DIASTOLIC BLOOD PRESSURE: 74 MMHG | SYSTOLIC BLOOD PRESSURE: 122 MMHG | HEART RATE: 87 BPM | WEIGHT: 187.4 LBS | OXYGEN SATURATION: 98 % | HEIGHT: 72 IN

## 2025-05-05 DIAGNOSIS — G89.29 CHRONIC MIDLINE LOW BACK PAIN WITH BILATERAL SCIATICA: Primary | ICD-10-CM

## 2025-05-05 DIAGNOSIS — M54.42 CHRONIC MIDLINE LOW BACK PAIN WITH BILATERAL SCIATICA: Primary | ICD-10-CM

## 2025-05-05 DIAGNOSIS — G89.21 CHRONIC PAIN DUE TO TRAUMA: ICD-10-CM

## 2025-05-05 DIAGNOSIS — M54.41 CHRONIC MIDLINE LOW BACK PAIN WITH BILATERAL SCIATICA: Primary | ICD-10-CM

## 2025-05-05 DIAGNOSIS — F11.20 CONTINUOUS OPIOID DEPENDENCE (HCC): ICD-10-CM

## 2025-05-05 PROCEDURE — 99214 OFFICE O/P EST MOD 30 MIN: CPT | Performed by: FAMILY MEDICINE

## 2025-05-05 RX ORDER — GABAPENTIN 100 MG/1
100 CAPSULE ORAL 2 TIMES DAILY
Qty: 60 CAPSULE | Refills: 1 | Status: SHIPPED | OUTPATIENT
Start: 2025-05-05

## 2025-05-05 NOTE — PROGRESS NOTES
Adult Annual Physical  Name: Nataliya Kendrick      : 1995      MRN: [id number]  Encounter Provider: Robert Budinetz, MD  Encounter Date: 2025   Encounter department: [facility]    :  Assessment & Plan  Chronic midline low back pain with bilateral sciatica  Check mri  Add gabapentin  Pdmp reviewed    Orders:    MRI lumbar spine wo contrast; Future    gabapentin (Neurontin) 100 mg capsule; Take 1 capsule (100 mg total) by mouth 2 (two) times a day        Preventive Screenings:  - Diabetes Screening: screening up-to-date  - Lung cancer screening: screening not indicated   - Prostate cancer screening: screening not indicated     Immunizations:  - Immunizations due: Prevnar 20         History of Present Illness     Adult Annual Physical:  Patient presents for annual physical. Nataliya has chronic neck and low back pain.  He has been in neurosurgery that recommended surgery I reviewed that from [facility] 2 years to 3 years ago.  He also is been through physical therapy he is on Percocet we will try to cut that back I added gabapentin today.  He is doing exercises at home.  The pain is on a routine daily basis.  He has tried to cut back the Percocet but the pain increases..     Diet and Physical Activity:  - Diet/Nutrition: no special diet.  - Exercise: no formal exercise and moderate cardiovascular exercise.    General Health:  - Sleep: sleeps well and 4-6 hours of sleep on average.  - Hearing: normal hearing right ear, normal hearing left ear and normal hearing bilateral ears.  - Vision: vision problems and wears glasses.  - Dental: regular dental visits.    Review of Systems   Respiratory: Negative.     Cardiovascular: Negative.    Gastrointestinal: Negative.          Objective   /74 (BP Location: Left arm, Patient Position: Sitting, Cuff Size: Standard)   Pulse 87   Ht 6' (1.829 m)   Wt 85 kg (187 lb 6.4 oz)   SpO2 98%   BMI 25.42 kg/m²     Physical Exam  Vitals and nursing note  reviewed.   Constitutional:       General: He is not in acute distress.     Appearance: He is well-developed.   HENT:      Head: Normocephalic and atraumatic.   Eyes:      Conjunctiva/sclera: Conjunctivae normal.   Cardiovascular:      Rate and Rhythm: Normal rate and regular rhythm.      Heart sounds: No murmur heard.  Pulmonary:      Effort: Pulmonary effort is normal. No respiratory distress.      Breath sounds: Normal breath sounds.   Abdominal:      Palpations: Abdomen is soft.      Tenderness: There is no abdominal tenderness.   Musculoskeletal:         General: No swelling.      Cervical back: Neck supple.      Comments: Lumbar spasm b/l   Skin:     General: Skin is warm and dry.      Capillary Refill: Capillary refill takes less than 2 seconds.   Neurological:      Mental Status: He is alert.   Psychiatric:         Mood and Affect: Mood normal.

## 2025-05-05 NOTE — ASSESSMENT & PLAN NOTE
Check mri  Add gabapentin  Pdmp reviewed    Orders:    MRI lumbar spine wo contrast; Future    gabapentin (Neurontin) 100 mg capsule; Take 1 capsule (100 mg total) by mouth 2 (two) times a day

## 2025-05-07 LAB
7AMINOCLONAZEPAM SAL QL CFM: NEGATIVE NG/ML
AMPHET SAL QL CFM: NEGATIVE NG/ML
BUPRENORPHINE SAL QL SCN: NEGATIVE NG/ML
CARBOXYTHC SAL QL CFM: NEGATIVE NG/ML
CCP IGG SERPL-ACNC: NEGATIVE NG/ML
COCAINE SAL QL CFM: NEGATIVE NG/ML
CODEINE SAL QL CFM: NEGATIVE NG/ML
DXO+LEVORPHANOL SAL QL CFM: NEGATIVE NG/ML
EDDP SAL QL CFM: NEGATIVE NG/ML
GABAPENTIN SAL QL CFM: ABNORMAL NG/ML
HYDROCODONE SAL QL CFM: NEGATIVE NG/ML
LEUKEMIA MARKERS BLD-IMP: NEGATIVE NG/ML
M PROTEIN 3 UR ELPH-MCNC: NORMAL NG/ML
M TB TUBERC IGNF/MITOGEN IGNF CONTROL: NEGATIVE NG/ML
METHADONE SAL QL CFM: NEGATIVE NG/ML
MORPHINE SAL QL CFM: NEGATIVE NG/ML
NALTREXOL SAL QL CFM: NEGATIVE NG/ML
NALTREXONE SAL QL CFM: NEGATIVE NG/ML
OXYMORPHONE SAL QL CFM: NORMAL NG/ML
OXYMORPHONE SAL QL CFM: NORMAL NG/ML
PREGABALIN SAL QL CFM: NEGATIVE NG/ML
RESULT ALL_PRESCRIBED MEDS AND SPECIAL INSTRUCTIONS: NORMAL
SL AMB 6-MAM (HEROIN METABOLITE) QUANTIFICATION: NEGATIVE NG/ML
SL AMB ALPRAZOLAM QUANTIFICATION: NEGATIVE NG/ML
SL AMB CLONAZEPAM QUANTIFICATION: NEGATIVE NG/ML
SL AMB DIAZEPAM QUANTIFICATION: NEGATIVE NG/ML
SL AMB FENTANYL QUANTIFICATION: NEGATIVE NG/ML
SL AMB N-DESMETHYL-TRAMADOL QUANTIFICATION SALIVA: NEGATIVE NG/ML
SL AMB NORBUPRENORPHINE QUANTIFICATION: NEGATIVE NG/ML
SL AMB NORDIAZEPAM QUANTIFICATION: NEGATIVE NG/ML
SL AMB NORFENTANYL QUANTIFICATION: NEGATIVE NG/ML
SL AMB NORHYDROCODONE QUANTIFICATION: NEGATIVE NG/ML
SL AMB NORMEPERIDINE QUANTIFICATION: NEGATIVE NG/ML
SL AMB NOROXYCODONE QUANTIFICATION: NORMAL NG/ML
SL AMB OXAZEPAM QUANTIFICATION: NEGATIVE NG/ML
SL AMB RITALINIC ACID QUANTIFICATION: NEGATIVE NG/ML
SL AMB TEMAZEPAM QUANTIFICATION: NEGATIVE NG/ML
SL AMB TEMAZEPAM QUANTIFICATION: NEGATIVE NG/ML
SL AMB TRAMADOL QUANTIFICATION: NEGATIVE NG/ML
SQUAMOUS #/AREA URNS HPF: NEGATIVE NG/ML
TAPENTADOL SAL QL CFM: NEGATIVE NG/ML

## 2025-05-21 ENCOUNTER — OFFICE VISIT (OUTPATIENT)
Dept: URGENT CARE | Facility: CLINIC | Age: 30
End: 2025-05-21
Payer: COMMERCIAL

## 2025-05-21 VITALS
HEIGHT: 72 IN | TEMPERATURE: 98.3 F | HEART RATE: 98 BPM | OXYGEN SATURATION: 98 % | SYSTOLIC BLOOD PRESSURE: 149 MMHG | WEIGHT: 184.6 LBS | DIASTOLIC BLOOD PRESSURE: 86 MMHG | BODY MASS INDEX: 25 KG/M2 | RESPIRATION RATE: 20 BRPM

## 2025-05-21 DIAGNOSIS — G43.009 MIGRAINE WITHOUT AURA AND WITHOUT STATUS MIGRAINOSUS, NOT INTRACTABLE: Primary | ICD-10-CM

## 2025-05-21 PROCEDURE — 99213 OFFICE O/P EST LOW 20 MIN: CPT | Performed by: NURSE PRACTITIONER

## 2025-05-21 RX ORDER — METHYLPREDNISOLONE 4 MG/1
TABLET ORAL
Qty: 21 EACH | Refills: 0 | Status: SHIPPED | OUTPATIENT
Start: 2025-05-21

## 2025-05-21 NOTE — PATIENT INSTRUCTIONS
"Patient Education     Migraine in adults   The Basics   Written by the doctors and editors at [facility]   What is migraine? -- This is a condition that involves a headache as well as other symptoms.  Migraine can affect both adults and children. It is more common in females. Migraine headaches, or \"attacks,\" often start mild and then get worse.  What are the symptoms of migraine in adults? -- Symptoms can include:   Headache - The headache gets worse over several hours and is usually throbbing. It often affects 1 side of the head.   Nausea and sometimes vomiting   Sensitivity to light and noise - Lying down in a quiet, dark room often helps.   Aura - Some people have something called a migraine \"aura.\" This is a symptom or feeling that happens before or during the migraine attack. The aura usually lasts a few minutes to an hour and then goes away. For most people, it lasts 15 to 30 minutes.  Each person's aura is different, but in most cases, the aura affects your vision. During an aura, you might:   See flashing lights, bright spots, or zigzag lines   Lose part of your vision   Have numbness and tingling of the lips, lower face, and fingers of 1 hand   Hear sounds or have ringing in their ears  People who get migraine with aura usually cannot take birth control pills that have estrogen. That's because they might increase the risk of stroke.  Many people get other symptoms of migraine. These symptoms can happen several hours or even a day before the headache. Doctors call these \"premonitory\" or \"prodromal\" symptoms. They might include yawning, feeling depressed, irritability, food cravings, constipation, or a stiff neck.  Will I need tests? -- Probably not. Your doctor or nurse will ask you questions and do an exam.  Can migraine attacks be prevented? -- Yes. Some people find that their migraine attacks are triggered by certain things. If you can avoid some of these things, you can lower your chances of getting " "migraine attacks.  You can also keep a \"headache diary.\" In the diary, write down:   Every time you have a migraine attack   What you ate and did before it started - This can help you decide if there is anything you should avoid eating or doing.   What medicine you took, and if it helped  Common migraine triggers include:   Stress   Hormonal changes   Skipping meals or not eating enough   Changes in the weather   Sleeping too much or too little   Bright or flashing lights   Drinking alcohol   Eating certain foods, such as aged cheese and hot dogs   Smoking or being around smoke  If your migraine attacks are frequent or severe, your doctor can suggest other ways to help prevent them. For example, it might help to learn relaxation techniques and ways to manage stress. There are also medicines that can help.  Some people get migraine attacks just before or during their period. Medicine can help with this, too.  How are migraine attacks treated? -- There are many different medicines that can help with migraine. Your doctor can help you find the best treatment for your situation.  For mild migraine, your doctor might suggest an over-the-counter medicine to take during a migraine attack. These include:   Acetaminophen (sample brand name: Tylenol)   Ibuprofen (sample brand names: Advil, Motrin)   Naproxen (sample brand name: Aleve)   A medicine that combines acetaminophen, aspirin, and caffeine (sample brand name: Excedrin)  For more severe migraine, there are prescription medicines that can help. Some, such as medicines called triptans, help relieve the pain from a migraine attack. Other prescription medicines can help make migraine attacks happen less often. If you have severe nausea or vomiting with your migraine attacks, there are medicines that can help with that, too.  Do not try to treat frequent migraine attacks on your own with non-prescription pain medicines. Taking these too often can actually cause more " headaches later.  What else can I do to feel better? -- You can try:   Resting in a quiet, dark room with a cool cloth on your forehead   Sleeping   Taking the medicine or medicines that your doctor suggested - Do not take medicines that you have not discussed with your doctor.  What if I want to get pregnant? -- Talk to your doctor or nurse before you start trying. Some medicines used to treat and prevent migraine are not safe during pregnancy, so you might need to switch medicines before you get pregnant.  Some people notice that their migraine symptoms actually get better during pregnancy and breastfeeding. This is related to hormonal changes in the body.  When should I call the doctor? -- Call your doctor or nurse if:    You think that you are having migraine attacks.   Your migraine attacks get worse or more frequent.   You have new symptoms.  All topics are updated as new evidence becomes available and our peer review process is complete.  This topic retrieved from [employer] on: May 18, 2024.  Topic 124419 Version 9.0  Release: 32.4.3 - C32.137  © 2024 UpToDate, Inc. and/or its affiliates. All rights reserved.  Consumer Information Use and Disclaimer   Disclaimer: This generalized information is a limited summary of diagnosis, treatment, and/or medication information. It is not meant to be comprehensive and should be used as a tool to help the user understand and/or assess potential diagnostic and treatment options. It does NOT include all information about conditions, treatments, medications, side effects, or risks that may apply to a specific patient. It is not intended to be medical advice or a substitute for the medical advice, diagnosis, or treatment of a health care provider based on the health care provider's examination and assessment of a patient's specific and unique circumstances. Patients must speak with a health care provider for complete information about their health, medical questions, and  treatment options, including any risks or benefits regarding use of medications. This information does not endorse any treatments or medications as safe, effective, or approved for treating a specific patient. UpToDate, Inc. and its affiliates disclaim any warranty or liability relating to this information or the use thereof.The use of this information is governed by the Terms of Use, available at https://www.[employer].com/en/know/clinical-effectiveness-terms. 2024© UpToDate, Inc. and its affiliates and/or licensors. All rights reserved.  Copyright   © 2024 UpToDate, Inc. and/or its affiliates. All rights reserved.

## 2025-05-21 NOTE — LETTER
May 21, 2025     Patient: Nataliya Kendrick   YOB: 1995   Date of Visit: 5/21/2025       To Whom it May Concern:    Nataliya Kendrick was seen in my clinic on 5/21/2025. He may return to work on 05/22/2025.    If you have any questions or concerns, please don't hesitate to call.         Sincerely,          ELIZABETH Linder        CC: No Recipients

## 2025-05-21 NOTE — PROGRESS NOTES
[facility]  Name: Nataliya Kendrick      : 1995      MRN: [id number]  Encounter Provider: ELIZABETH Linder  Encounter Date: 2025   Encounter department: [facility] [place]  :  Assessment & Plan  Migraine without aura and without status migrainosus, not intractable    Orders:    methylPREDNISolone 4 MG tablet therapy pack; Use as directed on package        Patient Instructions  Patient recently with ibuprofen, so will defer Toradol shot at this time.  Steroid provided.  Advise follow-up with PCP to discuss possible prescription for abortive medications.  Follow up with PCP in 3-5 days.  Proceed to  ER if symptoms worsen.    If tests are performed, our office will contact you with results only if changes need to made to the care plan discussed with you at the visit. [facility]    Chief Complaint:   Chief Complaint   Patient presents with    Migraine     Migraine since Monday.      History of Present Illness   29-year-old male here today with concerns of 7 out of 10 migraine for the past 2 days.  Patient reports he does have a past medical history of migraines.  Patient reports migraine occurs on the right side temporal/occipital.  Patient reports dark rooms and NSAIDs does make the pain better.  Patient is triggered by light, noise and bending over.  Patient reports last dose of ibuprofen was 3 PM.      Migraine  This is a recurrent problem. The current episode started in the past 7 days. The problem occurs constantly. Pain location: Right temporal/occipital. The pain does not radiate. The quality of the pain is described as aching and throbbing. The pain is at a severity of 7/10. Associated symptoms include phonophobia and photophobia. Pertinent negatives include no abdominal pain, abnormal behavior, anorexia, back pain, blurred vision, coughing, diarrhea, dizziness, drainage, ear pain, eye pain, eye redness, eye watering, facial  sweating, fever, hearing loss, insomnia, loss of balance, muscle aches, nausea, neck pain, numbness, rhinorrhea, seizures, sinus pressure, sore throat, swollen glands, tingling, tinnitus, visual change, vomiting, weakness or weight loss. Exacerbated by: Bright lights, noise, bending over. Past treatments include NSAIDs. His past medical history is significant for migraine headaches.         Review of Systems   Constitutional: Negative.  Negative for fever and weight loss.   HENT: Negative.  Negative for ear pain, hearing loss, rhinorrhea, sinus pressure, sore throat and tinnitus.    Eyes:  Positive for photophobia. Negative for blurred vision, pain and redness.   Respiratory: Negative.  Negative for cough.    Cardiovascular: Negative.    Gastrointestinal: Negative.  Negative for abdominal pain, anorexia, diarrhea, nausea and vomiting.   Genitourinary: Negative.    Musculoskeletal:  Negative for back pain and neck pain.   Skin: Negative.    Neurological:  Negative for dizziness, tingling, seizures, weakness, numbness and loss of balance.   Psychiatric/Behavioral:  The patient does not have insomnia.      Past Medical History   Past Medical History[1]  Past Surgical History[2]  Family History[3]  he reports that he has been smoking e-cigarettes. He has never been exposed to tobacco smoke. He has quit using smokeless tobacco. He reports that he does not currently use alcohol. He reports that he does not use drugs.  Current Outpatient Medications   Medication Instructions    gabapentin (NEURONTIN) 100 mg, Oral, 2 times daily    methylPREDNISolone 4 MG tablet therapy pack Use as directed on package    oxyCODONE-acetaminophen (Percocet) 5-325 mg per tablet 1 tablet, Oral, Every 6 hours PRN   Allergies[4]     Objective   /86   Pulse 98   Temp 98.3 °F (36.8 °C)   Resp 20   Ht 6' (1.829 m)   Wt 83.7 kg (184 lb 9.6 oz)   SpO2 98%   BMI 25.04 kg/m²      Physical Exam  Constitutional:       General: He is not in  "acute distress.     Appearance: Normal appearance. He is not ill-appearing, toxic-appearing or diaphoretic.   HENT:      Head: Normocephalic and atraumatic.     Eyes:      General:         Right eye: No discharge.         Left eye: No discharge.     Pulmonary:      Effort: Pulmonary effort is normal. No respiratory distress.     Skin:     Coloration: Skin is not jaundiced or pale.      Findings: No bruising, erythema, lesion or rash.     Neurological:      Mental Status: He is alert and oriented to person, place, and time.      Cranial Nerves: Cranial nerves 2-12 are intact.      Sensory: Sensation is intact.      Motor: Motor function is intact. No weakness.     Psychiatric:         Mood and Affect: Mood normal.         Behavior: Behavior normal.         Thought Content: Thought content normal.         Judgment: Judgment normal.         Portions of the record may have been created with voice recognition software.  Occasional wrong word or \"sound a like\" substitutions may have occurred due to the inherent limitations of voice recognition software.  Read the chart carefully and recognize, using context, where substitutions have occurred.       [1]   Past Medical History:  Diagnosis Date    Fractures     Oth fracture of fifth lumbar vertebra, init for clos fx ([facility]) 07/25/2015   [2]   Past Surgical History:  Procedure Laterality Date    CRANIOTOMY     [3]   Family History  Problem Relation Name Age of Onset    No Known Problems Mother      No Known Problems Father      No Known Problems Brother     [4] No Known Allergies    "

## 2025-05-30 DIAGNOSIS — M51.26 LUMBAR DISC HERNIATION: ICD-10-CM

## 2025-06-01 ENCOUNTER — HOSPITAL ENCOUNTER (OUTPATIENT)
Dept: MRI IMAGING | Facility: HOSPITAL | Age: 30
Discharge: HOME | End: 2025-06-01
Attending: FAMILY MEDICINE
Payer: COMMERCIAL

## 2025-06-01 DIAGNOSIS — M54.41 CHRONIC MIDLINE LOW BACK PAIN WITH BILATERAL SCIATICA: ICD-10-CM

## 2025-06-01 DIAGNOSIS — G89.29 CHRONIC MIDLINE LOW BACK PAIN WITH BILATERAL SCIATICA: ICD-10-CM

## 2025-06-01 DIAGNOSIS — M54.42 CHRONIC MIDLINE LOW BACK PAIN WITH BILATERAL SCIATICA: ICD-10-CM

## 2025-06-01 PROCEDURE — 72148 MRI LUMBAR SPINE W/O DYE: CPT

## 2025-06-01 RX ORDER — OXYCODONE AND ACETAMINOPHEN 5; 325 MG/1; MG/1
1 TABLET ORAL EVERY 6 HOURS PRN
Qty: 120 TABLET | Refills: 0 | Status: SHIPPED | OUTPATIENT
Start: 2025-06-01

## 2025-06-04 ENCOUNTER — OFFICE VISIT (OUTPATIENT)
Dept: FAMILY MEDICINE CLINIC | Facility: CLINIC | Age: 30
End: 2025-06-04
Payer: COMMERCIAL

## 2025-06-04 VITALS
SYSTOLIC BLOOD PRESSURE: 128 MMHG | HEART RATE: 88 BPM | WEIGHT: 186 LBS | OXYGEN SATURATION: 99 % | HEIGHT: 72 IN | DIASTOLIC BLOOD PRESSURE: 72 MMHG | BODY MASS INDEX: 25.19 KG/M2

## 2025-06-04 DIAGNOSIS — M54.41 CHRONIC MIDLINE LOW BACK PAIN WITH BILATERAL SCIATICA: Primary | ICD-10-CM

## 2025-06-04 DIAGNOSIS — M54.42 CHRONIC MIDLINE LOW BACK PAIN WITH BILATERAL SCIATICA: Primary | ICD-10-CM

## 2025-06-04 DIAGNOSIS — G89.29 CHRONIC MIDLINE LOW BACK PAIN WITH BILATERAL SCIATICA: Primary | ICD-10-CM

## 2025-06-04 PROBLEM — G43.109 MIGRAINE WITH AURA AND WITHOUT STATUS MIGRAINOSUS, NOT INTRACTABLE: Status: ACTIVE | Noted: 2025-06-04

## 2025-06-04 PROCEDURE — 99213 OFFICE O/P EST LOW 20 MIN: CPT | Performed by: FAMILY MEDICINE

## 2025-06-04 NOTE — PROGRESS NOTES
Name: Nataliya Kendrick      : 1995      MRN: [id number]  Encounter Provider: Robert Budinetz, MD  Encounter Date: 2025   Encounter department: [facility]  :  Assessment & Plan  Chronic midline low back pain with bilateral sciatica         I did refill his Percocet we did review his MRI.  He has already had a pain management consultation.  He is still considering epidural steroid injections       History of Present Illness   Patient has daily back pain the Percocet helps him function and work.  He did see pain management they recommended epidural steroid injections he is still considering this.  He had an MRI which we reviewed the results.      Review of Systems   Respiratory: Negative.     Cardiovascular: Negative.    Gastrointestinal: Negative.        Objective   /72 (BP Location: Left arm, Patient Position: Sitting, Cuff Size: Large)   Pulse 88   Ht 6' (1.829 m)   Wt 84.4 kg (186 lb)   SpO2 99%   BMI 25.23 kg/m²      Physical Exam  Vitals and nursing note reviewed.   Constitutional:       General: He is not in acute distress.     Appearance: He is well-developed.   HENT:      Head: Normocephalic and atraumatic.     Eyes:      Conjunctiva/sclera: Conjunctivae normal.       Cardiovascular:      Rate and Rhythm: Normal rate and regular rhythm.      Heart sounds: No murmur heard.  Pulmonary:      Effort: Pulmonary effort is normal. No respiratory distress.      Breath sounds: Normal breath sounds.   Abdominal:      Palpations: Abdomen is soft.      Tenderness: There is no abdominal tenderness.     Musculoskeletal:         General: No swelling.      Cervical back: Neck supple.     Skin:     General: Skin is warm and dry.      Capillary Refill: Capillary refill takes less than 2 seconds.     Neurological:      Mental Status: He is alert.     Psychiatric:         Mood and Affect: Mood normal.

## 2025-06-23 DIAGNOSIS — M51.26 LUMBAR DISC HERNIATION: ICD-10-CM

## 2025-06-24 RX ORDER — OXYCODONE AND ACETAMINOPHEN 5; 325 MG/1; MG/1
1 TABLET ORAL EVERY 6 HOURS PRN
Qty: 120 TABLET | Refills: 0 | Status: SHIPPED | OUTPATIENT
Start: 2025-06-24

## 2025-07-22 DIAGNOSIS — M51.26 LUMBAR DISC HERNIATION: ICD-10-CM

## 2025-07-23 RX ORDER — OXYCODONE AND ACETAMINOPHEN 5; 325 MG/1; MG/1
1 TABLET ORAL EVERY 6 HOURS PRN
Qty: 120 TABLET | Refills: 0 | Status: SHIPPED | OUTPATIENT
Start: 2025-07-23

## 2025-08-01 ENCOUNTER — HOSPITAL ENCOUNTER (EMERGENCY)
Facility: HOSPITAL | Age: 30
Discharge: HOME | End: 2025-08-01
Attending: EMERGENCY MEDICINE | Admitting: EMERGENCY MEDICINE
Payer: COMMERCIAL

## 2025-08-01 ENCOUNTER — OFFICE VISIT (OUTPATIENT)
Dept: URGENT CARE | Facility: CLINIC | Age: 30
End: 2025-08-01
Payer: COMMERCIAL

## 2025-08-01 VITALS
TEMPERATURE: 99 F | DIASTOLIC BLOOD PRESSURE: 78 MMHG | BODY MASS INDEX: 25.6 KG/M2 | OXYGEN SATURATION: 98 % | HEIGHT: 72 IN | WEIGHT: 189 LBS | RESPIRATION RATE: 16 BRPM | HEART RATE: 77 BPM | SYSTOLIC BLOOD PRESSURE: 156 MMHG

## 2025-08-01 DIAGNOSIS — H10.33 ACUTE BACTERIAL CONJUNCTIVITIS OF BOTH EYES: Primary | ICD-10-CM

## 2025-08-01 PROCEDURE — 99213 OFFICE O/P EST LOW 20 MIN: CPT | Performed by: PHYSICIAN ASSISTANT

## 2025-08-01 RX ORDER — TOBRAMYCIN AND DEXAMETHASONE 3; 1 MG/ML; MG/ML
1 SUSPENSION/ DROPS OPHTHALMIC 4 TIMES DAILY
Qty: 5 ML | Refills: 0 | Status: SHIPPED | OUTPATIENT
Start: 2025-08-01 | End: 2025-08-08

## 2025-08-13 ENCOUNTER — TELEPHONE (OUTPATIENT)
Dept: FAMILY MEDICINE CLINIC | Facility: CLINIC | Age: 30
End: 2025-08-13

## 2025-08-19 DIAGNOSIS — M51.26 LUMBAR DISC HERNIATION: ICD-10-CM

## 2025-08-20 RX ORDER — OXYCODONE AND ACETAMINOPHEN 5; 325 MG/1; MG/1
1 TABLET ORAL EVERY 6 HOURS PRN
Qty: 120 TABLET | Refills: 0 | Status: SHIPPED | OUTPATIENT
Start: 2025-08-20